# Patient Record
Sex: FEMALE | Race: WHITE | NOT HISPANIC OR LATINO | Employment: FULL TIME | URBAN - METROPOLITAN AREA
[De-identification: names, ages, dates, MRNs, and addresses within clinical notes are randomized per-mention and may not be internally consistent; named-entity substitution may affect disease eponyms.]

---

## 2023-08-31 ENCOUNTER — OFFICE VISIT (OUTPATIENT)
Dept: PRIMARY CARE | Facility: CLINIC | Age: 53
End: 2023-08-31
Payer: COMMERCIAL

## 2023-08-31 ENCOUNTER — LAB (OUTPATIENT)
Dept: LAB | Facility: LAB | Age: 53
End: 2023-08-31
Payer: COMMERCIAL

## 2023-08-31 VITALS
DIASTOLIC BLOOD PRESSURE: 80 MMHG | TEMPERATURE: 97.7 F | BODY MASS INDEX: 25.71 KG/M2 | WEIGHT: 169.1 LBS | SYSTOLIC BLOOD PRESSURE: 124 MMHG

## 2023-08-31 DIAGNOSIS — Z00.00 HEALTHCARE MAINTENANCE: ICD-10-CM

## 2023-08-31 DIAGNOSIS — Z78.0 MENOPAUSE: ICD-10-CM

## 2023-08-31 DIAGNOSIS — Z78.0 MENOPAUSE: Primary | ICD-10-CM

## 2023-08-31 DIAGNOSIS — E78.2 MIXED HYPERLIPIDEMIA: Primary | ICD-10-CM

## 2023-08-31 LAB
BASOPHILS (10*3/UL) IN BLOOD BY AUTOMATED COUNT: 0.04 X10E9/L (ref 0–0.1)
BASOPHILS/100 LEUKOCYTES IN BLOOD BY AUTOMATED COUNT: 0.7 % (ref 0–2)
EOSINOPHILS (10*3/UL) IN BLOOD BY AUTOMATED COUNT: 0.03 X10E9/L (ref 0–0.7)
EOSINOPHILS/100 LEUKOCYTES IN BLOOD BY AUTOMATED COUNT: 0.6 % (ref 0–6)
ERYTHROCYTE DISTRIBUTION WIDTH (RATIO) BY AUTOMATED COUNT: 12.3 % (ref 11.5–14.5)
ERYTHROCYTE MEAN CORPUSCULAR HEMOGLOBIN CONCENTRATION (G/DL) BY AUTOMATED: 32.8 G/DL (ref 32–36)
ERYTHROCYTE MEAN CORPUSCULAR VOLUME (FL) BY AUTOMATED COUNT: 101 FL (ref 80–100)
ERYTHROCYTES (10*6/UL) IN BLOOD BY AUTOMATED COUNT: 4.36 X10E12/L (ref 4–5.2)
HEMATOCRIT (%) IN BLOOD BY AUTOMATED COUNT: 44.2 % (ref 36–46)
HEMOGLOBIN (G/DL) IN BLOOD: 14.5 G/DL (ref 12–16)
IMMATURE GRANULOCYTES/100 LEUKOCYTES IN BLOOD BY AUTOMATED COUNT: 0.2 % (ref 0–0.9)
LEUKOCYTES (10*3/UL) IN BLOOD BY AUTOMATED COUNT: 5.4 X10E9/L (ref 4.4–11.3)
LYMPHOCYTES (10*3/UL) IN BLOOD BY AUTOMATED COUNT: 1.62 X10E9/L (ref 1.2–4.8)
LYMPHOCYTES/100 LEUKOCYTES IN BLOOD BY AUTOMATED COUNT: 29.8 % (ref 13–44)
MONOCYTES (10*3/UL) IN BLOOD BY AUTOMATED COUNT: 0.47 X10E9/L (ref 0.1–1)
MONOCYTES/100 LEUKOCYTES IN BLOOD BY AUTOMATED COUNT: 8.6 % (ref 2–10)
NEUTROPHILS (10*3/UL) IN BLOOD BY AUTOMATED COUNT: 3.27 X10E9/L (ref 1.2–7.7)
NEUTROPHILS/100 LEUKOCYTES IN BLOOD BY AUTOMATED COUNT: 60.1 % (ref 40–80)
NRBC (PER 100 WBCS) BY AUTOMATED COUNT: 0 /100 WBC (ref 0–0)
PLATELETS (10*3/UL) IN BLOOD AUTOMATED COUNT: 314 X10E9/L (ref 150–450)

## 2023-08-31 PROCEDURE — 99213 OFFICE O/P EST LOW 20 MIN: CPT | Performed by: FAMILY MEDICINE

## 2023-08-31 PROCEDURE — 84443 ASSAY THYROID STIM HORMONE: CPT

## 2023-08-31 PROCEDURE — 1036F TOBACCO NON-USER: CPT | Performed by: FAMILY MEDICINE

## 2023-08-31 PROCEDURE — 80061 LIPID PANEL: CPT

## 2023-08-31 PROCEDURE — 80053 COMPREHEN METABOLIC PANEL: CPT

## 2023-08-31 PROCEDURE — 36415 COLL VENOUS BLD VENIPUNCTURE: CPT

## 2023-08-31 PROCEDURE — 85025 COMPLETE CBC W/AUTO DIFF WBC: CPT

## 2023-08-31 NOTE — PROGRESS NOTES
Subjective   Patient ID: Bekah Nicholson is a 53 y.o. female who presents for Follow-up (EP. Here to talk about menopause and discuss BW.).  HPI  Feels she is perimenopausal and wonders about hormone replacement therapy  Is very irritable; has vasomotor symptoms- night sweats disrupt sleep    LMP in May      Objective   Visit Vitals  /80   Temp 36.5 °C (97.7 °F) (Oral)        Assessment/Plan   Diagnoses and all orders for this visit:  Menopause  -     CBC and Auto Differential; Future  -     Comprehensive Metabolic Panel; Future  -     TSH with reflex to Free T4 if abnormal; Future  -     Lipid Panel; Future  Healthcare maintenance  -     CBC and Auto Differential; Future  -     Comprehensive Metabolic Panel; Future  -     TSH with reflex to Free T4 if abnormal; Future  -     Lipid Panel; Future    Rec consult with compounding pharmacy for BHRT      Yvette Maria MD  Family Medicine   [facility]

## 2023-09-01 LAB
ALANINE AMINOTRANSFERASE (SGPT) (U/L) IN SER/PLAS: 15 U/L (ref 7–45)
ALBUMIN (G/DL) IN SER/PLAS: 4.7 G/DL (ref 3.4–5)
ALKALINE PHOSPHATASE (U/L) IN SER/PLAS: 60 U/L (ref 33–110)
ANION GAP IN SER/PLAS: 16 MMOL/L (ref 10–20)
ASPARTATE AMINOTRANSFERASE (SGOT) (U/L) IN SER/PLAS: 17 U/L (ref 9–39)
BILIRUBIN TOTAL (MG/DL) IN SER/PLAS: 0.7 MG/DL (ref 0–1.2)
CALCIUM (MG/DL) IN SER/PLAS: 10.7 MG/DL (ref 8.6–10.6)
CARBON DIOXIDE, TOTAL (MMOL/L) IN SER/PLAS: 27 MMOL/L (ref 21–32)
CHLORIDE (MMOL/L) IN SER/PLAS: 104 MMOL/L (ref 98–107)
CHOLESTEROL (MG/DL) IN SER/PLAS: 266 MG/DL (ref 0–199)
CHOLESTEROL IN HDL (MG/DL) IN SER/PLAS: 75.3 MG/DL
CHOLESTEROL/HDL RATIO: 3.5
CREATININE (MG/DL) IN SER/PLAS: 0.83 MG/DL (ref 0.5–1.05)
GFR FEMALE: 84 ML/MIN/1.73M2
GLUCOSE (MG/DL) IN SER/PLAS: 90 MG/DL (ref 74–99)
LDL: 162 MG/DL (ref 0–99)
POTASSIUM (MMOL/L) IN SER/PLAS: 5.2 MMOL/L (ref 3.5–5.3)
PROTEIN TOTAL: 7.7 G/DL (ref 6.4–8.2)
SODIUM (MMOL/L) IN SER/PLAS: 142 MMOL/L (ref 136–145)
THYROTROPIN (MIU/L) IN SER/PLAS BY DETECTION LIMIT <= 0.05 MIU/L: 3.17 MIU/L (ref 0.44–3.98)
TRIGLYCERIDE (MG/DL) IN SER/PLAS: 145 MG/DL (ref 0–149)
UREA NITROGEN (MG/DL) IN SER/PLAS: 18 MG/DL (ref 6–23)
VLDL: 29 MG/DL (ref 0–40)

## 2023-11-17 ENCOUNTER — TELEPHONE (OUTPATIENT)
Dept: PRIMARY CARE | Facility: CLINIC | Age: 53
End: 2023-11-17
Payer: COMMERCIAL

## 2023-11-17 NOTE — TELEPHONE ENCOUNTER
Patient josem on machine, have you seen her results from the saliva test & others, what is your interpretation. Call patient [phone]

## 2023-12-12 ENCOUNTER — TELEPHONE (OUTPATIENT)
Dept: PRIMARY CARE | Facility: CLINIC | Age: 53
End: 2023-12-12
Payer: COMMERCIAL

## 2023-12-12 NOTE — TELEPHONE ENCOUNTER
Patient ldm on machine, she states she's had a cold sore for 6 wks, it's a cut on Rt side of mouth. Can you prescribe a medication or does she need to be seen. Call patient & advise [phone]

## 2024-02-15 ENCOUNTER — TELEPHONE (OUTPATIENT)
Dept: PRIMARY CARE | Facility: CLINIC | Age: 54
End: 2024-02-15
Payer: COMMERCIAL

## 2024-02-15 NOTE — TELEPHONE ENCOUNTER
Pt called ldm on machine requesting a b/w order.  She asked for a full panel of b/w with the thyroid.  Please advise pt. She would like a return call.  [phone]

## 2024-02-28 NOTE — TELEPHONE ENCOUNTER
Pt ldm returning call, stated she doesn't need a follow up appt. Pt stated  just requested for her to do bw.

## 2024-02-29 NOTE — TELEPHONE ENCOUNTER
Please get additional information from patient- what doctor asked for bloodwork? She was seen to discuss menopause in August and I referred her to compounding pharmacy for saliva testing and Rx compounded hormone medication (see phone messages from Nov/Dec).

## 2024-03-01 NOTE — TELEPHONE ENCOUNTER
Spoke to pt and she wanted blood work to recheck cholesterol because it was elevated.  Also asking for thyroid tests.  States that her father has hemochromatosis and she wanted blood work to check for that as well because she states it is genetic.

## 2024-03-04 DIAGNOSIS — Z00.00 HEALTHCARE MAINTENANCE: ICD-10-CM

## 2024-03-04 DIAGNOSIS — E78.5 HYPERLIPIDEMIA, UNSPECIFIED HYPERLIPIDEMIA TYPE: ICD-10-CM

## 2024-03-04 NOTE — TELEPHONE ENCOUNTER
Please place order for lipid panel, TSH, free T4, serum iron, CBC, and CMP. Dx: HL and health maintenance. Let her know she should make appt to discuss labs.

## 2024-03-11 ENCOUNTER — LAB (OUTPATIENT)
Dept: LAB | Facility: LAB | Age: 54
End: 2024-03-11
Payer: COMMERCIAL

## 2024-03-11 DIAGNOSIS — Z00.00 HEALTHCARE MAINTENANCE: ICD-10-CM

## 2024-03-11 DIAGNOSIS — E78.5 HYPERLIPIDEMIA, UNSPECIFIED HYPERLIPIDEMIA TYPE: ICD-10-CM

## 2024-03-11 DIAGNOSIS — E78.2 MIXED HYPERLIPIDEMIA: ICD-10-CM

## 2024-03-11 LAB
ALBUMIN SERPL BCP-MCNC: 4.2 G/DL (ref 3.4–5)
ALP SERPL-CCNC: 54 U/L (ref 33–110)
ALT SERPL W P-5'-P-CCNC: 13 U/L (ref 7–45)
ANION GAP SERPL CALC-SCNC: 12 MMOL/L (ref 10–20)
AST SERPL W P-5'-P-CCNC: 15 U/L (ref 9–39)
BASOPHILS # BLD AUTO: 0.03 X10*3/UL (ref 0–0.1)
BASOPHILS NFR BLD AUTO: 0.6 %
BILIRUB SERPL-MCNC: 0.5 MG/DL (ref 0–1.2)
BUN SERPL-MCNC: 12 MG/DL (ref 6–23)
CALCIUM SERPL-MCNC: 9.5 MG/DL (ref 8.6–10.6)
CHLORIDE SERPL-SCNC: 107 MMOL/L (ref 98–107)
CHOLEST SERPL-MCNC: 199 MG/DL (ref 0–199)
CHOLESTEROL/HDL RATIO: 3.1
CO2 SERPL-SCNC: 28 MMOL/L (ref 21–32)
CREAT SERPL-MCNC: 0.65 MG/DL (ref 0.5–1.05)
EGFRCR SERPLBLD CKD-EPI 2021: >90 ML/MIN/1.73M*2
EOSINOPHIL # BLD AUTO: 0.14 X10*3/UL (ref 0–0.7)
EOSINOPHIL NFR BLD AUTO: 2.6 %
ERYTHROCYTE [DISTWIDTH] IN BLOOD BY AUTOMATED COUNT: 12 % (ref 11.5–14.5)
GLUCOSE SERPL-MCNC: 95 MG/DL (ref 74–99)
HCT VFR BLD AUTO: 40.4 % (ref 36–46)
HDLC SERPL-MCNC: 64.9 MG/DL
HGB BLD-MCNC: 13.3 G/DL (ref 12–16)
IMM GRANULOCYTES # BLD AUTO: 0.02 X10*3/UL (ref 0–0.7)
IMM GRANULOCYTES NFR BLD AUTO: 0.4 % (ref 0–0.9)
IRON SATN MFR SERPL: 35 % (ref 25–45)
IRON SERPL-MCNC: 101 UG/DL (ref 35–150)
LDLC SERPL CALC-MCNC: 119 MG/DL
LYMPHOCYTES # BLD AUTO: 1.84 X10*3/UL (ref 1.2–4.8)
LYMPHOCYTES NFR BLD AUTO: 33.9 %
MCH RBC QN AUTO: 32.8 PG (ref 26–34)
MCHC RBC AUTO-ENTMCNC: 32.9 G/DL (ref 32–36)
MCV RBC AUTO: 100 FL (ref 80–100)
MONOCYTES # BLD AUTO: 0.5 X10*3/UL (ref 0.1–1)
MONOCYTES NFR BLD AUTO: 9.2 %
NEUTROPHILS # BLD AUTO: 2.9 X10*3/UL (ref 1.2–7.7)
NEUTROPHILS NFR BLD AUTO: 53.3 %
NON HDL CHOLESTEROL: 134 MG/DL (ref 0–149)
NRBC BLD-RTO: 0 /100 WBCS (ref 0–0)
PLATELET # BLD AUTO: 256 X10*3/UL (ref 150–450)
POTASSIUM SERPL-SCNC: 4.6 MMOL/L (ref 3.5–5.3)
PROT SERPL-MCNC: 6.4 G/DL (ref 6.4–8.2)
RBC # BLD AUTO: 4.05 X10*6/UL (ref 4–5.2)
SODIUM SERPL-SCNC: 142 MMOL/L (ref 136–145)
T4 FREE SERPL-MCNC: 0.98 NG/DL (ref 0.78–1.48)
TIBC SERPL-MCNC: 287 UG/DL (ref 240–445)
TRIGL SERPL-MCNC: 78 MG/DL (ref 0–149)
TSH SERPL-ACNC: 4.04 MIU/L (ref 0.44–3.98)
UIBC SERPL-MCNC: 186 UG/DL (ref 110–370)
VLDL: 16 MG/DL (ref 0–40)
WBC # BLD AUTO: 5.4 X10*3/UL (ref 4.4–11.3)

## 2024-03-11 PROCEDURE — 80061 LIPID PANEL: CPT

## 2024-03-11 PROCEDURE — 80053 COMPREHEN METABOLIC PANEL: CPT

## 2024-03-11 PROCEDURE — 84439 ASSAY OF FREE THYROXINE: CPT

## 2024-03-11 PROCEDURE — 36415 COLL VENOUS BLD VENIPUNCTURE: CPT

## 2024-03-11 PROCEDURE — 85025 COMPLETE CBC W/AUTO DIFF WBC: CPT

## 2024-03-11 PROCEDURE — 83550 IRON BINDING TEST: CPT

## 2024-03-11 PROCEDURE — 84443 ASSAY THYROID STIM HORMONE: CPT

## 2024-03-11 PROCEDURE — 83540 ASSAY OF IRON: CPT

## 2024-03-29 ENCOUNTER — TELEPHONE (OUTPATIENT)
Dept: PRIMARY CARE | Facility: CLINIC | Age: 54
End: 2024-03-29
Payer: COMMERCIAL

## 2024-03-29 NOTE — TELEPHONE ENCOUNTER
Pt called ldm on machine stating she has not adrian back regarding her b/w results.  Please advise

## 2024-09-06 ENCOUNTER — HOSPITAL ENCOUNTER (OUTPATIENT)
Dept: RADIOLOGY | Facility: CLINIC | Age: 54
Discharge: HOME | End: 2024-09-06
Payer: COMMERCIAL

## 2024-09-06 VITALS — BODY MASS INDEX: 25.01 KG/M2 | WEIGHT: 165 LBS | HEIGHT: 68 IN

## 2024-09-06 DIAGNOSIS — Z12.31 SCREENING MAMMOGRAM FOR BREAST CANCER: ICD-10-CM

## 2024-09-06 PROCEDURE — 77067 SCR MAMMO BI INCL CAD: CPT

## 2024-09-17 NOTE — PROGRESS NOTES
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       Date of Injury: 2024 when hiking.  [place] pop R SI area.    Worse with: changing motion, getting out of the car, laying on her back  Better with: nothing  She has maintained activity walking and performing low back exercises, squats, LE stretching 3 - 4 days weekly.  Light activity.  Has introduced some light weight lifiting. Cautious as she does not want to hurt her back further.   No numbness or tingling.  No radiation of pain.    No previous LBP, images / xrays.    Has not worked with physical therapy.    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     Pain R low back/SI area, focal.  Annoying.  Feels like it needs to pop.      Past MSK HX:  Specialty Problems    None       ROS  12 point ROS reviewed and is negative except for items listed   Weight gain, glasses, snoring.  Menarche age 13. .  LMP Spring 2024    Social Hx:  Home:  Lives with , daughter Elan      Medications:   No current outpatient medications on file prior to visit.     No current facility-administered medications on file prior to visit.         Allergies:    Allergies   Allergen Reactions    Penicillins Rash     Peeling rash, arthralgias.        Physical Exam:    Visit Vitals  OB Status Perimenopausal   Smoking Status Never        Vitals reviewed    General appearance: Well-appearing well-nourished  Psych: Normal mood and affect    Neuro: Normal sensation to light touch throughout the involved extremities  Vascular: No extremity edema or discoloration.  Skin: negative.  Lymphatic:  no regional lymphadenopathy present.  Eyes: no conjunctival injection.      LUMBAR SPINE EXAM:    Visual Inspection:   Normal posture   Scoliosis: none   Deformity: none   Pelvic obliquity: +    Range of motion:  Forward flexion (90) Full, pain RSI  Extension (30) Full, pain free  Lateral bend right (30) Full, pain RSI  Lateral bend Left (30) Full, pain free RSI  Lateral rotation right (45) Full, pain RSI  Lateral rotation left (45) Full, pain RSI    Hip flexion supine: (140) Full, pain RSI  Hip extension (prone) (15) Full, pain free  Hip abduction (45) Full, pain free  Hip adduction (30-45) Full, pain free  Hip IR at 90 flexion (40) Full, pain RSI  Hip ER at 90 Flexion(40-50) Full, pain free      Palpation:   TTP the midline / spinous process no pain  TTP paraspinous musculature RSI, R QL  TTP posterior superior iliac spine no pain  TTP ischial tuberosities no pain  TTP gluteus musculature R  TTP SI joint R  TTP greater trochanter R  TTP hip joint line no pain   TTP Abdomen/no abd masses no pain    Strength:  Great toe (L5) pain free, 5/5  Ankle inversion (L4/5) pain free, 5/5  Ankle eversion (L5,S1) pain free, 5/5  Ankle Dorsiflexion (L4/5) pain free, 5/5  Ankle plantarflexion (S1/2) pain free, 5/5  Knee extension (L3/4) pain free, 5/5  Knee flexion (L5,S1)  pain free, 5/5  Hip flexion (L2/3) pain RSI, R QL, 4+/5  Hip Extension (L4,5) pain free, 4/5  Hip aduction (L4/5) pain free, 4/5  Hip adduction (L2,3)  pain free, 4+/5    Special Tests:  Stork test: negative bilaterally  Sphinx test: negative  Straight leg raise: negative  Seated slump test: negative  FADIR: pain RSI  ELISEO: pain RSI    Trendelenburg: +  SL squats: +    Neuro:  Clonus: none  Sensation:   Nl sensation to light touch L5: interspace great toe  Nl sensation to light touch S1: small toe   Nl sensation to light touch L4 lateral border great toe    Flexibility:  Popliteal angle: 160  Quad heel to butt: 4 inch  Deon test L: +  Deon test R:  +    Gait normal     Osteopathic Exam:   Rib:  Rib 10 posterior R.  Thoracic spine:  T7-8 rotated R    Left innominate posterior   No step-off appreciated at the pubic symphysis.    Piriformis restricted R  SI restricted R  Lumbar:  L4-5 rotated R, sidebent L  Sacrum rotated R on L axis.    Procedure  Today, osteopathic manipulative medicine was performed on the  thoracic spine, lumbar spine, ribs, pelvis, sacrum,  lower extremities  The patient tolerated soft tissue techniques, muscle energy techniques, articulatory techniques, respiratory assist techniques to these areas. No complications were experienced. Improved range of motion, alignment, and comfort noted OMM.      Imaging:  Lumbosacral xrays reviewed. Degenerative disc disease noted.  Imaging was personally interpreted and reviewed with the patient and/or family    Impression and Plan:  Bekah Nicholson is a 54 y.o. female who presented on 09/18/2024  with back pain, R SI joint dysfunction.  History of R low back pain R SI joint pain since April 2024 when hiking and felt a pop. On exam pain R SI with ROM, poor LE flexibility and poor core strength, TTP R SI, R QL, R gluteus medius muscle, tendon.    Xrays  Degenerative disc disease noted..  We discussed the importance of gluteal & hip strengthening to offer stability to SI and lumbar spine.  Physical therapy exercises with good technique  encouraged with patient goal to perform 4-5 days weekly. OMM performed and well tolerated.  We discussed use of foam roller, stretching and proper hydration following OMM.  Follow up 2 weeks.         ** Please excuse any errors in grammar or translation related to this dictation. Voice recognition software was utilized to prepare this document. **

## 2024-09-18 ENCOUNTER — HOSPITAL ENCOUNTER (OUTPATIENT)
Dept: RADIOLOGY | Facility: CLINIC | Age: 54
Discharge: HOME | End: 2024-09-18
Payer: COMMERCIAL

## 2024-09-18 ENCOUNTER — OFFICE VISIT (OUTPATIENT)
Dept: ORTHOPEDIC SURGERY | Facility: CLINIC | Age: 54
End: 2024-09-18
Payer: COMMERCIAL

## 2024-09-18 VITALS — BODY MASS INDEX: 25.4 KG/M2 | WEIGHT: 167 LBS

## 2024-09-18 DIAGNOSIS — M99.03 SOMATIC DYSFUNCTION OF LUMBAR REGION: ICD-10-CM

## 2024-09-18 DIAGNOSIS — M99.02 SOMATIC DYSFUNCTION OF THORACIC REGION: ICD-10-CM

## 2024-09-18 DIAGNOSIS — M99.04 SOMATIC DYSFUNCTION OF SACRAL REGION: ICD-10-CM

## 2024-09-18 DIAGNOSIS — M99.05 SOMATIC DYSFUNCTION OF PELVIS REGION: ICD-10-CM

## 2024-09-18 DIAGNOSIS — M54.50 LOW BACK PAIN: ICD-10-CM

## 2024-09-18 DIAGNOSIS — M99.08 SOMATIC DYSFUNCTION OF RIB CAGE REGION: ICD-10-CM

## 2024-09-18 DIAGNOSIS — M53.3 SACROILIAC DYSFUNCTION: Primary | ICD-10-CM

## 2024-09-18 DIAGNOSIS — M99.06 SOMATIC DYSFUNCTION OF LOWER EXTREMITY: ICD-10-CM

## 2024-09-18 PROCEDURE — 98927 OSTEOPATH MANJ 5-6 REGIONS: CPT | Performed by: PEDIATRICS

## 2024-09-18 PROCEDURE — 99204 OFFICE O/P NEW MOD 45 MIN: CPT | Performed by: PEDIATRICS

## 2024-09-18 PROCEDURE — 72100 X-RAY EXAM L-S SPINE 2/3 VWS: CPT | Performed by: RADIOLOGY

## 2024-09-18 PROCEDURE — 72100 X-RAY EXAM L-S SPINE 2/3 VWS: CPT

## 2024-09-18 PROCEDURE — 99214 OFFICE O/P EST MOD 30 MIN: CPT | Performed by: PEDIATRICS

## 2024-09-18 PROCEDURE — 1036F TOBACCO NON-USER: CPT | Performed by: PEDIATRICS

## 2024-09-18 ASSESSMENT — PAIN SCALES - GENERAL: PAINLEVEL: 3

## 2024-09-18 ASSESSMENT — ENCOUNTER SYMPTOMS
LOSS OF SENSATION IN FEET: 0
OCCASIONAL FEELINGS OF UNSTEADINESS: 0
DEPRESSION: 0

## 2024-09-26 ENCOUNTER — OFFICE VISIT (OUTPATIENT)
Dept: ORTHOPEDIC SURGERY | Facility: CLINIC | Age: 54
End: 2024-09-26
Payer: COMMERCIAL

## 2024-09-26 VITALS — BODY MASS INDEX: 25.4 KG/M2 | WEIGHT: 167 LBS

## 2024-09-26 DIAGNOSIS — M54.50 ACUTE RIGHT-SIDED LOW BACK PAIN WITHOUT SCIATICA: Primary | ICD-10-CM

## 2024-09-26 DIAGNOSIS — M99.04 SOMATIC DYSFUNCTION OF SACRAL REGION: ICD-10-CM

## 2024-09-26 DIAGNOSIS — M99.08 SOMATIC DYSFUNCTION OF RIB CAGE REGION: ICD-10-CM

## 2024-09-26 DIAGNOSIS — M99.06 SOMATIC DYSFUNCTION OF LOWER EXTREMITY: ICD-10-CM

## 2024-09-26 DIAGNOSIS — M99.03 SOMATIC DYSFUNCTION OF LUMBAR REGION: ICD-10-CM

## 2024-09-26 DIAGNOSIS — M99.02 SOMATIC DYSFUNCTION OF THORACIC REGION: ICD-10-CM

## 2024-09-26 DIAGNOSIS — M99.05 SOMATIC DYSFUNCTION OF PELVIS REGION: ICD-10-CM

## 2024-09-26 PROCEDURE — 98927 OSTEOPATH MANJ 5-6 REGIONS: CPT | Performed by: PEDIATRICS

## 2024-09-26 PROCEDURE — 97530 THERAPEUTIC ACTIVITIES: CPT | Performed by: PEDIATRICS

## 2024-09-26 PROCEDURE — 99214 OFFICE O/P EST MOD 30 MIN: CPT | Performed by: PEDIATRICS

## 2024-09-26 PROCEDURE — 99214 OFFICE O/P EST MOD 30 MIN: CPT | Mod: 25 | Performed by: PEDIATRICS

## 2024-09-26 ASSESSMENT — PAIN SCALES - GENERAL: PAINLEVEL: 2

## 2024-09-26 ASSESSMENT — ENCOUNTER SYMPTOMS
LOSS OF SENSATION IN FEET: 0
OCCASIONAL FEELINGS OF UNSTEADINESS: 0
DEPRESSION: 0

## 2024-09-26 NOTE — PROGRESS NOTES
Chief Complaint   Patient presents with    Lower Back - Pain, Follow-up       Consulting physician: Yvette Maria MD    A report with my findings and recommendations will be sent to the primary and referring physician via written or electronic means when information is available    History of Present Illness:  Bekah Nicholson is a 54 y.o. female who presented on 9/18/24  with back pain, R SI joint dysfunction.  History of R low back pain R SI joint pain since April 2024 when hiking and felt a pop. On exam pain R SI with ROM, poor LE flexibility and poor core strength, TTP R SI, R QL, R gluteus medius muscle, tendon.    Xrays  Degenerative disc disease noted.  We discussed the importance of gluteal & hip strengthening to offer stability to SI and lumbar spine.  Physical therapy exercises with good technique  encouraged with patient goal to perform 4-5 days weekly. OMM performed and well tolerated.  We discussed use of foam roller, stretching and proper hydration following OMM.  Follow up 2 weeks.     April 2024 when hiking.  [place] pop R SI area.  Worse with: changing motion, getting out of the car, laying on her back.  She has maintained activity walking and performing low back exercises, squats, LE stretching 3 - 4 days weekly.  Light activity.  Has introduced some light weight lifiting. Cautious as she does not want to hurt her back further. No numbness or tingling.  No radiation of pain.  No previous LBP, images / xrays.  Has not worked with physical therapy.  Pain R low back/SI area, focal.  Annoying.  Feels like it needs to pop.    9/26/24 Some improvement after OMM. Performed some weight lifting.  She has been avoiding crossing her legs.  She is stretching daily.  Introduced squats and gluteal activation to her exercises.      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           Past MSK HX:  Specialty Problems    None       ROS  12 point ROS reviewed  and is negative except for items listed   Weight gain, glasses, snoring.  Menarche age 13. .  LMP Spring 2024    Social Hx:  Home:  Lives with , daughter Elan      Medications:   No current outpatient medications on file prior to visit.     No current facility-administered medications on file prior to visit.         Allergies:    Allergies   Allergen Reactions    Penicillins Rash     Peeling rash, arthralgias.        Physical Exam:    Visit Vitals  OB Status Perimenopausal   Smoking Status Never        Vitals reviewed    General appearance: Well-appearing well-nourished  Psych: Normal mood and affect    Neuro: Normal sensation to light touch throughout the involved extremities  Vascular: No extremity edema or discoloration.  Skin: negative.  Lymphatic: no regional lymphadenopathy present.  Eyes: no conjunctival injection.      LUMBAR SPINE EXAM:    Visual Inspection:   Normal posture   Scoliosis: none   Deformity: none   Pelvic obliquity: +    Range of motion:  Forward flexion (90) Full, pain RSI  Extension (30) Full, pain free  Lateral bend right (30) Full, pain RSI  Lateral bend Left (30) Full, pain free RSI  Lateral rotation right (45) Full, pain RSI  Lateral rotation left (45) Full, pain RSI    Hip flexion supine: (140) Full, pain RSI  Hip extension (prone) (15) Full, pain free  Hip abduction (45) Full, pain free  Hip adduction (30-45) Full, pain free  Hip IR at 90 flexion (40) Full, pain RSI  Hip ER at 90 Flexion(40-50) Full, pain free      Palpation:   TTP the midline / spinous process no pain  TTP paraspinous musculature RSI, R QL  TTP posterior superior iliac spine no pain  TTP ischial tuberosities no pain  TTP gluteus musculature R  TTP SI joint R  TTP greater trochanter R  TTP hip joint line no pain   TTP Abdomen/no abd masses no pain    Strength:  Great toe (L5) pain free, 5/5  Ankle inversion (L4/5) pain free, 5/5  Ankle eversion (L5,S1) pain free, 5/5  Ankle Dorsiflexion (L4/5) pain free,  5/5  Ankle plantarflexion (S1/2) pain free, 5/5  Knee extension (L3/4) pain free, 5/5  Knee flexion (L5,S1)  pain free, 5/5  Hip flexion (L2/3) pain RSI, R QL, 4+/5  Hip Extension (L4,5) pain free, 4/5  Hip aduction (L4/5) pain free, 4/5  Hip adduction (L2,3)  pain free, 4+/5    Special Tests:  Stork test: negative bilaterally  Sphinx test: negative  Straight leg raise: negative  Seated slump test: negative  FADIR: pain RSI  ELISEO: pain RSI    Trendelenburg: +  SL squats: +    Neuro:  Clonus: none  Sensation:   Nl sensation to light touch L5: interspace great toe  Nl sensation to light touch S1: small toe   Nl sensation to light touch L4 lateral border great toe    Flexibility:  Popliteal angle: 160  Quad heel to butt: 4 inch  Deon test L: +  Deon test R: +    Gait normal     Osteopathic Exam:   Rib:  Rib 10 posterior R.  Thoracic spine:  T7-8 rotated R    Left innominate posterior   No step-off appreciated at the pubic symphysis.    Piriformis restricted R  SI restricted R  Lumbar:  L4-5 rotated R, sidebent L  Sacrum rotated R on L axis.    Procedure  Today, osteopathic manipulative medicine was performed on the  thoracic spine, lumbar spine, ribs, pelvis, sacrum,  lower extremities  The patient tolerated soft tissue techniques, muscle energy techniques, articulatory techniques, respiratory assist techniques to these areas. No complications were experienced. Improved range of motion, alignment, and comfort noted OMM.      Imaging:  Lumbosacral xrays reviewed. Degenerative disc disease noted.  Imaging was personally interpreted and reviewed with the patient and/or family    Impression and Plan:  Bekah Nicholson is a 54 y.o. female who presented on 9/18/24  with back pain, R SI joint dysfunction.  History of R low back pain R SI joint pain since April 2024 when hiking and felt a pop. On exam pain R SI with ROM, poor LE flexibility and poor core strength, TTP R SI, R QL, R gluteus medius muscle, tendon.    Xrays   Degenerative disc disease noted.  We discussed the importance of gluteal & hip strengthening to offer stability to SI and lumbar spine.  Physical therapy exercises with good technique  encouraged with patient goal to perform 4-5 days weekly. OMM performed and well tolerated.  We discussed use of foam roller, stretching and proper hydration following OMM.  Follow up 2 weeks.     9/26/24  OMM performed.  ATC taught additional exercises to address piriformis, lumbar rotation, gluteal activation, hip abduction.  Follow up 1-2 weeks.     A detailed exercise program (< 15 minutes of education time) was demonstrated and taught to the patient by Ash Sutton ATC / SMITHA. The purpose of the program was to restore functional strength, and/or range of motion, and/or flexibility. A handout with the exercises and instructions for online access to video demonstrations was provided.     ** Please excuse any errors in grammar or translation related to this dictation. Voice recognition software was utilized to prepare this document. **

## 2024-09-26 NOTE — PROGRESS NOTES
Access Code: [email or web address]  URL: [email or web address]  Date: 09/26/2024  Prepared by: Ash Sutton AT, PTA    Exercises  - Supine Piriformis Stretch with Foot on Ground  - 1-3 x daily - 7 x weekly - 3 reps - 30 hold  - Supine Figure 4 Piriformis Stretch  - 1-3 x daily - 7 x weekly - 3 reps - 30 hold  - Supine Lower Trunk Rotation  - 1-3 x daily - 7 x weekly - 1-3 sets - 10 reps - 5 hold  - Quadruped Hip Extension Kicks  - 1 x daily - 5-7 x weekly - 1-3 sets - 10 reps - 5 hold  - Standing Runner Hip Flexion Extension  - 1 x daily - 5-7 x weekly - 1-3 sets - 10 reps  - Hip Hiking on Step  - 1 x daily - 5-7 x weekly - 1-3 sets - 10 reps

## 2024-10-10 ENCOUNTER — HOSPITAL ENCOUNTER (OUTPATIENT)
Dept: RADIOLOGY | Facility: CLINIC | Age: 54
Discharge: HOME | End: 2024-10-10
Payer: COMMERCIAL

## 2024-10-10 ENCOUNTER — APPOINTMENT (OUTPATIENT)
Dept: ORTHOPEDIC SURGERY | Facility: CLINIC | Age: 54
End: 2024-10-10
Payer: COMMERCIAL

## 2024-10-10 DIAGNOSIS — M54.50 ACUTE RIGHT-SIDED LOW BACK PAIN WITHOUT SCIATICA: ICD-10-CM

## 2024-10-10 DIAGNOSIS — M25.551 HIP PAIN, ACUTE, RIGHT: ICD-10-CM

## 2024-10-10 DIAGNOSIS — M54.50 ACUTE RIGHT-SIDED LOW BACK PAIN WITHOUT SCIATICA: Primary | ICD-10-CM

## 2024-10-10 PROCEDURE — 99214 OFFICE O/P EST MOD 30 MIN: CPT | Performed by: PEDIATRICS

## 2024-10-10 PROCEDURE — 73521 X-RAY EXAM HIPS BI 2 VIEWS: CPT | Mod: 52

## 2024-10-10 PROCEDURE — 1036F TOBACCO NON-USER: CPT | Performed by: PEDIATRICS

## 2024-10-10 ASSESSMENT — PAIN SCALES - GENERAL: PAINLEVEL: 2

## 2024-10-10 NOTE — PROGRESS NOTES
Chief Complaint   Patient presents with    Lower Back - Follow-up, Pain       Consulting physician: Yvette Maria MD    A report with my findings and recommendations will be sent to the primary and referring physician via written or electronic means when information is available    History of Present Illness:  Bekah Nicholson is a 54 y.o. female who presented on 9/18/24  with back pain, R SI joint dysfunction.  History of R low back pain R SI joint pain since April 2024 when hiking and felt a pop. On exam pain R SI with ROM, poor LE flexibility and poor core strength, TTP R SI, R QL, R gluteus medius muscle, tendon.    Xrays  Degenerative disc disease noted.  We discussed the importance of gluteal & hip strengthening to offer stability to SI and lumbar spine.  Physical therapy exercises with good technique  encouraged with patient goal to perform 4-5 days weekly. 9/18/24, 9/26/24 OMM performed.  ATC taught additional exercises to address piriformis, lumbar rotation, gluteal activation, hip abduction..  We discussed use of foam roller, stretching and proper hydration following OMM.  Follow up 2 weeks.     April 2024 when hiking.  [place] pop R SI area.  Worse with: changing motion, getting out of the car, laying on her back.  She has maintained activity walking and performing low back exercises, squats, LE stretching 3 - 4 days weekly.  Light activity.  Has introduced some light weight lifiting. Cautious as she does not want to hurt her back further. No numbness or tingling.  No radiation of pain.  No previous LBP, images / xrays.  Has not worked with physical therapy.  Pain R low back/SI area, focal.  Annoying.  Feels like it needs to pop.    9/26/24 Some improvement after OMM. Performed some weight lifting.  She has been avoiding crossing her legs.  She is stretching daily.  Introduced squats and gluteal activation to her exercises.      10/10/24  No improvement in R posterior hip pain or R SI pain. She continues  to report tightness posterior R hip through R SI area.  Feels like it needs to pop.  She has been performing home exercise program without improvement.  Occasional numbness to dorsum R foot especially when driving, sometimes walking.      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           Past MSK HX:  Specialty Problems    None       ROS  12 point ROS reviewed and is negative except for items listed   Weight gain, glasses, snoring.  Menarche age 13. .  LMP Spring 2024    Social Hx:  Home:  Lives with , daughter Elan      Medications:   No current outpatient medications on file prior to visit.     No current facility-administered medications on file prior to visit.         Allergies:    Allergies   Allergen Reactions    Penicillins Rash     Peeling rash, arthralgias.        Physical Exam:    Visit Vitals  OB Status Perimenopausal   Smoking Status Never        Vitals reviewed    General appearance: Well-appearing well-nourished  Psych: Normal mood and affect    Neuro: Normal sensation to light touch throughout the involved extremities  Vascular: No extremity edema or discoloration.  Skin: negative.  Lymphatic: no regional lymphadenopathy present.  Eyes: no conjunctival injection.      LUMBAR SPINE EXAM:    Visual Inspection:   Normal posture   Scoliosis: none   Deformity: none   Pelvic obliquity: +    Range of motion:  Forward flexion (90) Full, pain RSI  Extension (30) Full, pain free  Lateral bend right (30) Full, pain RSI  Lateral bend Left (30) Full, pain free RSI  Lateral rotation right (45) Full, pain RSI  Lateral rotation left (45) Full, pain RSI    Hip flexion supine: (140) Full, pain RSI  Hip extension (prone) (15) Full, pain free  Hip abduction (45) Full, pain free  Hip adduction (30-45) Full, pain free  Hip IR at 90 flexion (40) Full, pain RSI  Hip ER at 90 Flexion(40-50) Full, pain free      Palpation:   TTP the midline / spinous  process no pain  TTP paraspinous musculature RSI, R QL  TTP posterior superior iliac spine no pain  TTP ischial tuberosities no pain  TTP gluteus musculature R  TTP SI joint R  TTP greater trochanter R  TTP hip joint line no pain   TTP Abdomen/no abd masses no pain    Strength:  Great toe (L5) pain free, 5/5  Ankle inversion (L4/5) pain free, 5/5  Ankle eversion (L5,S1) pain free, 5/5  Ankle Dorsiflexion (L4/5) pain free, 5/5  Ankle plantarflexion (S1/2) pain free, 5/5  Knee extension (L3/4) pain free, 5/5  Knee flexion (L5,S1)  pain free, 5/5  Hip flexion (L2/3) pain RSI, R QL, 4+/5  Hip Extension (L4,5) pain free, 4/5  Hip aduction (L4/5) pain free, 4/5  Hip adduction (L2,3)  pain free, 4+/5    Special Tests:  Stork test: negative bilaterally  Sphinx test: negative  Straight leg raise: negative  Seated slump test: negative  FADIR: pain RSI  ELISEO: pain RSI    Trendelenburg: +  SL squats: +    Neuro:  Clonus: none  Sensation:   Nl sensation to light touch L5: interspace great toe  Nl sensation to light touch S1: small toe   Nl sensation to light touch L4 lateral border great toe    Flexibility:  Popliteal angle: 160  Quad heel to butt: 4 inch  Deon test L: +  Deon test R: +    Gait normal     Imaging:  Lumbosacral xrays reviewed. Degenerative disc disease noted.  Bilateral Hip and AP pelvis obtained.   Imaging was personally interpreted and reviewed with the patient and/or family    Impression and Plan:  Bekah Nicholson is a 54 y.o. female who presented on 9/18/24  with back pain, R SI joint dysfunction.  History of R low back pain R SI joint pain since April 2024 when hiking and felt a pop. On exam pain R SI with ROM, poor LE flexibility and poor core strength, TTP R SI, R QL, R gluteus medius muscle, tendon.    Xrays  Degenerative disc disease noted.  We discussed the importance of gluteal & hip strengthening to offer stability to SI and lumbar spine.  Physical therapy exercises with good technique   encouraged with patient goal to perform 4-5 days weekly. 9/18/24, 9/26/24 OMM performed.  ATC taught additional exercises to address piriformis, lumbar rotation, gluteal activation, hip abduction..  We discussed use of foam roller, stretching and proper hydration following OMM.  Follow up 2 weeks.     10/10/24  Physical therapy requisition provided.  Javy Dowell if [facility] is not available.  Begin stability and strengthening program twice weekly with home program daily.  If no improvement in 4 weeks, we will consider MRI Lumbar spine v R hip to evaluate for labral tear.         ** Please excuse any errors in grammar or translation related to this dictation. Voice recognition software was utilized to prepare this document. **

## 2024-11-06 ENCOUNTER — EVALUATION (OUTPATIENT)
Dept: PHYSICAL THERAPY | Facility: CLINIC | Age: 54
End: 2024-11-06
Payer: COMMERCIAL

## 2024-11-06 DIAGNOSIS — M54.50 ACUTE RIGHT-SIDED LOW BACK PAIN WITHOUT SCIATICA: Primary | ICD-10-CM

## 2024-11-06 DIAGNOSIS — M25.551 HIP PAIN, ACUTE, RIGHT: ICD-10-CM

## 2024-11-06 PROCEDURE — 97110 THERAPEUTIC EXERCISES: CPT | Mod: GP

## 2024-11-06 PROCEDURE — 97161 PT EVAL LOW COMPLEX 20 MIN: CPT | Mod: GP

## 2024-11-06 ASSESSMENT — ENCOUNTER SYMPTOMS
DEPRESSION: 0
OCCASIONAL FEELINGS OF UNSTEADINESS: 0
LOSS OF SENSATION IN FEET: 1

## 2024-11-06 NOTE — PROGRESS NOTES
Physical Therapy Evaluation    Patient Name: Bekah Nicholson  MRN: [id number]  Today's Date: 11/6/2024  Time Calculation  Start Time: 1450  Stop Time: 1535  Time Calculation (min): 45 min  PT Evaluation Time Entry  PT Evaluation (Low) Time Entry: 35  PT Therapeutic Procedures Time Entry  Therapeutic Exercise Time Entry: 10        Insurance: [employer]  Authorization required by insurance after initial evaluation  Primary diagnosis: acute R sided LBP  Visit # 1    Assessment   Bekah Nicholson is a 54 y.o. referred for acute R sided low back pain, R hip pain who presents with chronic, constant pain levels with tenderness upon palpation R PSIS. Patient demonstrates concordant pain with lumbar ROM, concordant pain with thigh thrust, as well as decreased force production bilateral lower extremity/core. At this time, patient is limited with walking, sitting too long, exercise/lifting, and R sidelying. Patient would benefit from PT interventions to address the stated impairments, improve functional mobility, establish HEP, and return toward prior level of function.     Plan   Treatment/Interventions: Cryotherapy, Education/ Instruction, Hot pack, Manual therapy, Neuromuscular re-education, Therapeutic activities, Therapeutic exercises  PT Plan: Skilled PT  PT Frequency: 1-2 time per week  Duration: 8 visits  Rehab Potential: Good  Plan of Care Agreement: Patient      Current Problem  1. Acute right-sided low back pain without sciatica  PT eval and treat    Follow Up In Physical Therapy      2. Hip pain, acute, right  PT eval and treat    Follow Up In Physical Therapy        Problem List Items Addressed This Visit    None  Visit Diagnoses         Codes    Acute right-sided low back pain without sciatica    -  Primary M54.50    Relevant Orders    Follow Up In Physical Therapy    Hip pain, acute, right     M25.551    Relevant Orders    Follow Up In Physical Therapy            General:  General  Reason for Referral: Diagnosis   M54.50 (ICD-10-CM) - Acute right-sided low back pain without sciatica  M25.551 (ICD-10-CM) - Hip pain, acute, right  Referred By: Gin Zabala DO  Precautions:  Precautions  STEADI Fall Risk Score (The score of 4 or more indicates an increased risk of falling): 3  Precautions Comment: not crossing legs per patient    Subjective:  Chief complaints: R low back/buttock, nerve pain down the R leg to R foot  Onset Date/Mechanism of Injury: 04/2024 hiking felt a weird pull like something is shifted. Dealt with it and cannot get rid of it all.  Referred for PT: 10/11/2024  Previous History: Followed up with Dr. Zabala, 2x manipulation with Dr. Zabala and nicolas, but hasn't done them in a couple weeks.  Personal Factors that may impact care: chronic symptoms   Patient is cleared for physical therapy services by physician referral. Discussed concerns on intake forms. Patient is following up with medical providers to address.    Pain:  Current: 1/10 Best: 1/10 Worst: 4/10   Location: points to R PSIS/SI region, posterior LE to knee, dorsal and plantar foot   Type: jabbing, shooting, nerve pain   Aggravators: walking too much, sitting too long, getting out of the car   Alleviators: after stretching   Numbness/tingling: no    Function:   Work/Recreation: consulting; sitting, standing   Prior Level: no history of back or hip pain in the past   Current limitations: walking, sitting too long, exercise/lifting, R sidelying   Condition: Improving     Home Situation:    Stairs: no issues   Lives with family    Sleep:  Preferred position(s): supine     Goals for Therapy:    Strength and remove pain    Objective   Gait: Indep ambulation  Posture: forward head, rounded shoulders, decreased lumbar lordosis  Palpation: (+) TTP PSIS  Sensation: intact to light touch B LE    Lumbar AROM:  Flexion: fingertips to toes, no change  Extension: min limitation, no change  Side Bend R: fingertips to joint line, 1/10 R LB/PSIS region  Side  Bend L: fingertips to joint line, 1/10 R LB/PSIS region  Prone press up: 1-2/10 midline low back, 0/10 PSIS region    Hip Strength:  Flexion: 4-/5 R, 4/5 L  Abduction: 3+/5 B  Adduction: 5/5 B  Extension: 3+/5 B    Knee Strength:  Flexion: 5/5 B  Extension: 5/5 B    Ankle & Foot Strength:  Dorsiflexion: 5/5    Special Tests:  (+) thigh thrust, gowers  (-) SI compression, distraction, SLR, ELISEO, scour, sacral thrust    Outcome Measures:  Modified Oswestry: patient did not fill out form in its entirety, will add in next visit once completed    Treatment:  Therapeutic Exercise: Education on posture, positioning with use of lumbar roll, modification or discontinuation of any exercise that increases symptoms (i.e.peripheralization of symptoms).  Prone lying, prone lying on forearms, prone press ups on forearms, off mat R hip extension/hip flexor stretch    Access Code: [email or web address]  URL: [email or web address]  Date: 11/06/2024  Prepared by: Claribel Mejia  - Seated Correct Posture   - Lying Prone  - 1-2 x daily - 3-5 minutes hold  - Prone Press Up On Elbows  - 1-2 x daily - 2 sets - 10 reps  - Supine Quadriceps Stretch with Strap on Table  - 1-2 x daily - 3 reps - 30 seonds hold    OP Education: verbal, demonstration, HEP handout    Goals:  Active       PT Problem       Patient will resume exercise without adverse reaction       Start:  11/06/24    Expected End:  01/15/25            Patient will walk, sit, transfer with pain no greater than 2/10 at worst, no pain on average       Start:  11/06/24    Expected End:  01/15/25            Patient will improve strength by >=1/3 MMT to increase ease with daily functional mobility and assist with reduction of pain levels       Start:  11/06/24    Expected End:  01/15/25            Patient will demonstrate independence and compliance with HEP and self management        Start:  11/06/24    Expected End:  01/15/25            Patient will demonstrate a 10  % improvement on Modified Oswestry to demonstrate increased functional mobility       Start:  11/06/24    Expected End:  01/15/25

## 2024-11-14 ENCOUNTER — TREATMENT (OUTPATIENT)
Dept: PHYSICAL THERAPY | Facility: CLINIC | Age: 54
End: 2024-11-14
Payer: COMMERCIAL

## 2024-11-14 DIAGNOSIS — M54.50 ACUTE RIGHT-SIDED LOW BACK PAIN WITHOUT SCIATICA: Primary | ICD-10-CM

## 2024-11-14 DIAGNOSIS — M25.551 HIP PAIN, ACUTE, RIGHT: ICD-10-CM

## 2024-11-14 PROCEDURE — 97110 THERAPEUTIC EXERCISES: CPT | Mod: GP

## 2024-11-14 NOTE — PROGRESS NOTES
Physical Therapy    Physical Therapy Treatment    Patient Name: Bekah Nicholson  MRN: [id number]  Today's Date: 11/14/2024  Time Calculation  Start Time: 1518  Stop Time: 1600  Time Calculation (min): 42 min     PT Therapeutic Procedures Time Entry  Therapeutic Exercise Time Entry: 42        Insurance: [employer]  NO AUTH/ $700 DED (MET)/ 80% COVERAGE/ [place] VISITS/ $8000 OOP (NM)/ PER [employer] SITE REF#[id number]  Visit # 2    Assessment:  Patient returns for first follow up visit. Reviewed HEP; verbal needed for breathing sequencing and recommend patient to out loud count in order to ensure breathing with therapeutic exercises. Progressed with therapeutic exercises without adverse reaction. 1/10 at end of treatment.    Plan:  Continue per initial PT POC. Add in manual therapy next visit    Current Problem  1. Acute right-sided low back pain without sciatica        2. Hip pain, acute, right          Problem List Items Addressed This Visit             ICD-10-CM    Acute right-sided low back pain without sciatica - Primary M54.50    Hip pain, acute, right M25.551       General   **acute R sided low back pain, R hip pain who presents with chronic, constant pain levels with tenderness upon palpation R PSIS      Subjective    Pain no greater than 1/10   HEP a couple days this week, no issues    Pain  1/10 R PSIS/gluteal    Objective     Hip AROM:  Flexion: R 118 deg, L 125 deg  Internal Rotation: R 25 deg, L 27 deg  External Rotation: R 55 deg, L 60 deg    Joint Mobility: Hypomobility lumbar spine    Bridge endurance: 5 seconds  Leg length: (-)    Outcome Measures:  Modified Oswestry: 20% disability    Treatments:  Therapeutic Exercise: Education on posture, positioning with use of lumbar roll, modification or discontinuation of any exercise that increases symptoms (i.e.peripheralization of symptoms).  Scifit stepper x 5 mins   Step stretch R posterior 2 x 10 reps   Shuttle DL L4 2 x 15  Prone lying  Prone lying on forearms    Prone press ups on forearms 2 x 10 reps  Off mat R hip extension/hip flexor stretch 3 x 30 sec  Hooklying TA x 10 reps  Hooklying transitioned to sitting for patient comfort TA with hip adduction 2 x 10 reps  Sitting TA with hip clamshell Green TB 2 x 10 reps  Seated figure 4 stretch 3 x 30 sec     OP Education: verbal, demonstration, HEP handout    Access Code: [email or web address]  URL: [email or web address]  Date: 11/14/2024  Prepared by: Claribel    Exercises  - Hooklying Transversus Abdominis Palpation  - 1 x daily - 2 sets - 10 reps  - Supine Hip Adduction Isometric with Ball  - 1 x daily - 2 sets - 10 reps  - Hooklying Clamshell with Resistance  - 1 x daily - 2 sets - 10 reps  - Seated Figure 4 Stretch  - 3 reps - 30 seconds hold    Access Code: [email or web address]  URL: [email or web address]  Date: 11/14/2024  Prepared by: Claribel    Exercises  - Hooklying Transversus Abdominis Palpation  - 1 x daily - 2 sets - 10 reps  - Supine Hip Adduction Isometric with Ball  - 1 x daily - 2 sets - 10 reps  - Hooklying Clamshell with Resistance  - 1 x daily - 2 sets - 10 reps     Access Code: [email or web address]  URL: [email or web address]  Date: 11/06/2024  Prepared by: Claribel     Exercises  - Seated Correct Posture   - Lying Prone  - 1-2 x daily - 3-5 minutes hold  - Prone Press Up On Elbows  - 1-2 x daily - 2 sets - 10 reps  - Supine Quadriceps Stretch with Strap on Table  - 1-2 x daily - 3 reps - 30 seonds hold       Goals:  Active       PT Problem       Patient will resume exercise without adverse reaction       Start:  11/06/24    Expected End:  01/15/25            Patient will walk, sit, transfer with pain no greater than 2/10 at worst, no pain on average       Start:  11/06/24    Expected End:  01/15/25            Patient will improve strength by >=1/3 MMT to increase ease with daily functional mobility and assist with reduction of pain levels       Start:  11/06/24     Expected End:  01/15/25            Patient will demonstrate independence and compliance with HEP and self management        Start:  11/06/24    Expected End:  01/15/25            Patient will demonstrate a 10 % improvement on Modified Oswestry to demonstrate increased functional mobility       Start:  11/06/24    Expected End:  01/15/25

## 2024-12-06 ENCOUNTER — TREATMENT (OUTPATIENT)
Dept: PHYSICAL THERAPY | Facility: CLINIC | Age: 54
End: 2024-12-06
Payer: COMMERCIAL

## 2024-12-06 DIAGNOSIS — M25.551 HIP PAIN, ACUTE, RIGHT: ICD-10-CM

## 2024-12-06 DIAGNOSIS — M54.50 ACUTE RIGHT-SIDED LOW BACK PAIN WITHOUT SCIATICA: Primary | ICD-10-CM

## 2024-12-06 PROCEDURE — 97110 THERAPEUTIC EXERCISES: CPT | Mod: GP

## 2024-12-06 NOTE — PROGRESS NOTES
Physical Therapy    Physical Therapy Treatment    Patient Name: Bekah Nicholson  MRN: [id number]  Today's Date: 12/6/2024  Time Calculation  Start Time: 0747  Stop Time: 0829  Time Calculation (min): 42 min     PT Therapeutic Procedures Time Entry  Manual Therapy Time Entry: 4  Therapeutic Exercise Time Entry: 38        Insurance: [employer]  NO AUTH/ $700 DED (MET)/ 80% COVERAGE/ [place] VISITS/ $8000 OOP (NM)/ PER [employer] SITE REF#[id number]  Visit # 3    Assessment:  Progressed with therapeutic exercises to patient tolerance. Improvement in breathing, minor intermittent cues initially with Good carry over within session. Addition of manual therapy to improve mobility and assist with management of symptoms. No adverse effects were noted following treatment. 1-2/10 at end of treatment.    Plan:  Continue per initial PT POC. Plan to continue manual therapy as indicated.    Current Problem  1. Acute right-sided low back pain without sciatica  Follow Up In Physical Therapy      2. Hip pain, acute, right  Follow Up In Physical Therapy          Problem List Items Addressed This Visit             ICD-10-CM    Acute right-sided low back pain without sciatica - Primary M54.50    Hip pain, acute, right M25.551         General   **acute R sided low back pain, R hip pain who presents with chronic, constant pain levels with tenderness upon palpation R PSIS      Subjective    HEP 5 days in the past week.  Sore this morning.  Pain no greater than 2/10. Feels like it needs to pop.  Getting easier to lay on stomach.    Pain  2/10 R PSIS/gluteal achy     Objective       Treatments:  Therapeutic Exercise: Education on posture, positioning with use of lumbar roll, modification or discontinuation of any exercise that increases symptoms (i.e.peripheralization of symptoms).  Scifit stepper S20 x 5 mins   Step stretch R posterior x 20 reps   Shuttle DL L4 2 x 15 reps, SL L3 2 x 15 reps  Seated figure 4 stretch 3 x 30 sec  Hooklying TA x 10  reps  Hooklying TA with hip adduction 2 x 10 reps  Sitting TA with hip clamshell Green TB 2 x 10 reps  Off mat R hip extension/hip flexor stretch 3 x 30 sec    Not today:  Prone lying  Prone lying on forearms   Prone press ups on forearms 2 x 10 reps    Manual Therapy: Patient educated regarding manual therapy risks and benefits.  Patient given opportunity to stop if needed.  Patient aware and agrees.  Sidelying sacroiliac mobilization grade V  Cavitation reported by patient in low back      OP Education: verbal, demonstration, HEP handout    Access Code: [email or web address]  URL: [email or web address]  Date: 11/14/2024  Prepared by: Claribel    Exercises  - Hooklying Transversus Abdominis Palpation  - 1 x daily - 2 sets - 10 reps  - Supine Hip Adduction Isometric with Ball  - 1 x daily - 2 sets - 10 reps  - Hooklying Clamshell with Resistance  - 1 x daily - 2 sets - 10 reps  - Seated Figure 4 Stretch  - 3 reps - 30 seconds hold    Access Code: [email or web address]  URL: [email or web address]  Date: 11/14/2024  Prepared by: Claribel    Exercises  - Hooklying Transversus Abdominis Palpation  - 1 x daily - 2 sets - 10 reps  - Supine Hip Adduction Isometric with Ball  - 1 x daily - 2 sets - 10 reps  - Hooklying Clamshell with Resistance  - 1 x daily - 2 sets - 10 reps     Access Code: [email or web address]  URL: [email or web address]  Date: 11/06/2024  Prepared by: Claribel     Exercises  - Seated Correct Posture   - Lying Prone  - 1-2 x daily - 3-5 minutes hold  - Prone Press Up On Elbows  - 1-2 x daily - 2 sets - 10 reps  - Supine Quadriceps Stretch with Strap on Table  - 1-2 x daily - 3 reps - 30 seonds hold       Goals:  Active       PT Problem       Patient will resume exercise without adverse reaction       Start:  11/06/24    Expected End:  01/15/25            Patient will walk, sit, transfer with pain no greater than 2/10 at worst, no pain on average       Start:  11/06/24     Expected End:  01/15/25            Patient will improve strength by >=1/3 MMT to increase ease with daily functional mobility and assist with reduction of pain levels       Start:  11/06/24    Expected End:  01/15/25            Patient will demonstrate independence and compliance with HEP and self management        Start:  11/06/24    Expected End:  01/15/25            Patient will demonstrate a 10 % improvement on Modified Oswestry to demonstrate increased functional mobility       Start:  11/06/24    Expected End:  01/15/25

## 2024-12-12 ENCOUNTER — OFFICE VISIT (OUTPATIENT)
Dept: ORTHOPEDIC SURGERY | Facility: CLINIC | Age: 54
End: 2024-12-12
Payer: COMMERCIAL

## 2024-12-12 VITALS — HEIGHT: 67 IN | WEIGHT: 167 LBS | BODY MASS INDEX: 26.21 KG/M2

## 2024-12-12 DIAGNOSIS — M54.50 CHRONIC MIDLINE LOW BACK PAIN WITHOUT SCIATICA: ICD-10-CM

## 2024-12-12 DIAGNOSIS — M62.89 EXERCISE-INDUCED LEG FATIGUE: Primary | ICD-10-CM

## 2024-12-12 DIAGNOSIS — M53.3 SACROILIAC DYSFUNCTION: ICD-10-CM

## 2024-12-12 DIAGNOSIS — G89.29 CHRONIC MIDLINE LOW BACK PAIN WITHOUT SCIATICA: ICD-10-CM

## 2024-12-12 PROCEDURE — 99214 OFFICE O/P EST MOD 30 MIN: CPT | Performed by: PEDIATRICS

## 2024-12-12 PROCEDURE — 1036F TOBACCO NON-USER: CPT | Performed by: PEDIATRICS

## 2024-12-12 PROCEDURE — 3008F BODY MASS INDEX DOCD: CPT | Performed by: PEDIATRICS

## 2024-12-12 ASSESSMENT — PAIN SCALES - GENERAL: PAINLEVEL_OUTOF10: 4

## 2024-12-12 NOTE — PATIENT INSTRUCTIONS
SI joint injection:  Dr. Minor Zaidi  [phone]  Dr. Brewer [phone]    Inflammatory lab evaluation    Consider LS and pelvis MRI

## 2024-12-12 NOTE — PROGRESS NOTES
Chief Complaint   Patient presents with    Lower Back - Follow-up, Pain     Consulting physician: Yvette Maria MD    A report with my findings and recommendations will be sent to the primary and referring physician via written or electronic means when information is available    History of Present Illness:  Bekah Nicholson is a 54 y.o. female who presented on 9/18/24  with back pain, R SI joint dysfunction.  History of R low back pain R SI joint pain since April 2024 when hiking and felt a pop. On exam pain R SI with ROM, poor LE flexibility and poor core strength, TTP R SI, R QL, R gluteus medius muscle, tendon.    Xrays  Degenerative disc disease noted.  We discussed the importance of gluteal & hip strengthening to offer stability to SI and lumbar spine.  Physical therapy exercises with good technique  encouraged with patient goal to perform 4-5 days weekly. 9/18/24, 9/26/24 OMM performed.  ATC taught additional exercises to address piriformis, lumbar rotation, gluteal activation, hip abduction..  We discussed use of foam roller, stretching and proper hydration following OMM.  Follow up 2 weeks.     April 2024 when hiking.  [place] pop R SI area.  Worse with: changing motion, getting out of the car, laying on her back.  She has maintained activity walking and performing low back exercises, squats, LE stretching 3 - 4 days weekly.  Light activity.  Has introduced some light weight lifiting. Cautious as she does not want to hurt her back further. No numbness or tingling.  No radiation of pain.  No previous LBP, images / xrays.  Has not worked with physical therapy.  Pain R low back/SI area, focal.  Annoying.  Feels like it needs to pop.    9/26/24 Some improvement after OMM. Performed some weight lifting.  She has been avoiding crossing her legs.  She is stretching daily.  Introduced squats and gluteal activation to her exercises.      10/10/24  No improvement in R posterior hip pain or R SI pain. She continues to  "report tightness posterior R hip through R SI area.  Feels like it needs to pop.  She has been performing home exercise program without improvement.  Occasional numbness to dorsum R foot especially when driving, sometimes walking.      2024  Overall, Bekah feels like her pain in the right SI joint and lower lumbar spine remains about the same, if not slightly worse. She has been going to PT once a week and doing home exercises daily, and these have not helped her at all. She continues to endorse a sensation of needing something to pop. She is unable to exercise like she wants to because of the pain. She has numbness and tingling going down her right leg when driving, which has been a chronic issue. She has tried taking Ibuprofen at night to help her sleep, which hasn't significantly helped. Occasionally, the pain wakes her up at night.    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     Past MSK HX:  Specialty Problems    None     ROS  12 point ROS reviewed and is negative except for items listed   Weight gain, glasses, snoring.  Menarche age 13. .  LMP Spring 2024    Social Hx:  Home:  Lives with , daughter Elan    Medications:   No current outpatient medications on file prior to visit.     No current facility-administered medications on file prior to visit.     Allergies:    Allergies   Allergen Reactions    Penicillins Rash     Peeling rash, arthralgias.      Physical Exam:    Visit Vitals  Ht 1.702 m (5' 7\")   Wt 75.8 kg (167 lb)   BMI 26.16 kg/m²   OB Status Perimenopausal   Smoking Status Never   BSA 1.89 m²      Vitals reviewed    General appearance: Well-appearing well-nourished  Psych: Normal mood and affect    Neuro: Normal sensation to light touch throughout the involved extremities  Vascular: No extremity edema or discoloration.  Skin: negative.  Lymphatic: no regional lymphadenopathy present.  Eyes: no conjunctival " injection.    LUMBAR SPINE EXAM:    Visual Inspection:   Normal posture   Scoliosis: none   Deformity: none   Pelvic obliquity: none    Range of motion:  Forward flexion (90) Full, pain RSI  Extension (30) Full, pain RSI and midline lumbar spine L4-L5  Lateral bend right (30) Full, pain RSI  Lateral bend Left (30) Full, pain free RSI  Lateral rotation right (45) Full, pain RSI  Lateral rotation left (45) Full, pain RSI    Hip flexion supine: (140) Full, pain RSI  Hip extension (prone) (15) Full, pain free  Hip abduction (45) Full, pain free  Hip adduction (30-45) Full, pain free  Hip IR at 90 flexion (40) Full, pain RSI  Hip ER at 90 Flexion(40-50) Full, pain RSI    Palpation:   TTP the midline / spinous process L4-L5  TTP paraspinous musculature RSI  TTP posterior superior iliac spine R  TTP ischial tuberosities no pain  TTP gluteus musculature R over the gluteal fossa  TTP SI joint R  TTP greater trochanter no pain  TTP hip joint line no pain   TTP Abdomen/no abd masses no pain    Strength:  Great toe (L5) pain free, 5/5  Ankle inversion (L4/5) pain free, 5/5  Ankle eversion (L5,S1) pain free, 5/5  Ankle Dorsiflexion (L4/5) pain free, 5/5  Ankle plantarflexion (S1/2) pain free, 5/5  Knee extension (L3/4) pain free, 5/5  Knee flexion (L5,S1)  pain free, 5/5  Hip flexion (L2/3) pain RSI, 5/5  Hip Extension (L4,5) pain free, 5/5  Hip aduction (L4/5) pain free, 5/5  Hip adduction (L2,3)  pain free, 5/5    Special Tests:  Stork test: positive bilaterally L4-L5  Sphinx test: positive L4-L5  Straight leg raise: RSI pain with SLR on both sides but no radicular symptoms  Seated slump test: negative  FADIR: pain RSI  ELISEO: pain RSI    Trendelenburg: none  SL squats: no pain    Neuro:  Clonus: none  Sensation:   Nl sensation to light touch L5: interspace great toe  Nl sensation to light touch S1: small toe   Nl sensation to light touch L4 lateral border great toe    Flexibility:  Popliteal angle: 170  Quad heel to butt: 4  inch  Deon test L: +  Deon test R: +    Gait normal     Imaging:  Lumbosacral x-rays 9/18/2024 reviewed. Degenerative disc disease noted.  Bilateral Hip and AP pelvis x-rays 10/10/2024 reviewed. Bilateral CAM deformity.  Imaging was personally interpreted and reviewed with the patient and/or family    Impression and Plan:  Bekah Nicholson is a 54 y.o. female who presented on 9/18/24 with back pain, R SI joint dysfunction.  History of R low back pain R SI joint pain since April 2024 when hiking and felt a pop. On exam pain R SI with ROM, poor LE flexibility and poor core strength, TTP R SI, R QL, R gluteus medius muscle, tendon.    Xrays  Degenerative disc disease noted.  We discussed the importance of gluteal & hip strengthening to offer stability to SI and lumbar spine.  Physical therapy exercises with good technique  encouraged with patient goal to perform 4-5 days weekly. 9/18/24, 9/26/24 OMM performed.  ATC taught additional exercises to address piriformis, lumbar rotation, gluteal activation, hip abduction. We discussed use of foam roller, stretching and proper hydration following OMM.  Follow up 2 weeks.     10/10/24  Physical therapy requisition provided.  Javy Dowell if [facility] is not available.  Begin stability and strengthening program twice weekly with home program daily.  If no improvement in 4 weeks, we will consider MRI Lumbar spine v R hip to evaluate for labral tear.     12/12/2024  Continues to have both R SI and lumbar midline back pain that has not improved despite anti-inflammatories, PT, HEP, and OMM. Exam today with RSI pain with range of motion testing and hip flexion/IR/ER, as well as midline L4-L5 pain with extension. TTP R SI and PSIS, L4-L5 midline, and R gluteal fossa. Positive Stork and Sphinx tests. SLR/ELISEO/FADIR elicit RSI pain. Given ongoing unimproved pain, will order labs (CBC, ESR, CRP, HLA-B27) to evaluate for underlying inflammatory conditions (such as  ankylosing spondylitis) that could cause her SI joint pain; she will get these drawn with her routine labs with her PCP in January. We also provided several providers that she can see to consider an SI joint injection for pain relief. We discussed obtaining an MRI of the lumbar spine and an MRI of the pelvis to better evaluate both her lower lumbar midline back pain and right SI joint pain; Bekah expressed she is hesitant about getting an MRI because of the loud noise. She will consider having the MRI done; depending on her lab work, an MRI may also be warranted, which she understsands. Will follow-up after labs and/or MRI are completed.    Lucio Kohli MD, MEd  Primary Care Sports Medicine Fellow, PGY-4  [facility]    ** Please excuse any errors in grammar or translation related to this dictation. Voice recognition software was utilized to prepare this document. **

## 2024-12-13 ENCOUNTER — TREATMENT (OUTPATIENT)
Dept: PHYSICAL THERAPY | Facility: CLINIC | Age: 54
End: 2024-12-13
Payer: COMMERCIAL

## 2024-12-13 DIAGNOSIS — M25.551 HIP PAIN, ACUTE, RIGHT: ICD-10-CM

## 2024-12-13 DIAGNOSIS — M54.50 ACUTE RIGHT-SIDED LOW BACK PAIN WITHOUT SCIATICA: Primary | ICD-10-CM

## 2024-12-13 PROCEDURE — 97110 THERAPEUTIC EXERCISES: CPT | Mod: GP

## 2024-12-13 NOTE — PROGRESS NOTES
Physical Therapy    Physical Therapy Treatment    Patient Name: Bekah Nicholson  MRN: [id number]  Today's Date: 12/13/2024  Time Calculation  Start Time: 0750  Stop Time: 0830  Time Calculation (min): 40 min     PT Therapeutic Procedures Time Entry  Therapeutic Exercise Time Entry: 40        Insurance: [employer]  NO AUTH/ $700 DED (MET)/ 80% C[place]AGE/ [place]IS[place]/ $8000 OOP (NM)/ PER [facility] SITE REF#[id number]  Visit # 4    Assessment:  Progressed with therapeutic exercises, added in MET. Pain slightly lower at end of treatment to 2/10. Plan to assess between session response at next visit.     Plan:  Continue per initial PT POC. Assess between session response next visit.    Current Problem  1. Acute right-sided low back pain without sciatica  Follow Up In Physical Therapy      2. Hip pain, acute, right  Follow Up In Physical Therapy            Problem List Items Addressed This Visit             ICD-10-CM    Acute right-sided low back pain without sciatica - Primary M54.50    Hip pain, acute, right M25.551       General   **acute R sided low back pain, R hip pain who presents with chronic, constant pain levels with tenderness upon palpation R PSIS      Subjective    Some days it's getting better, some days it's not.  HEP daily.  Cavitation felt better last time, but didn't last long. Pain 3/10 at worst in the past couple days    Pain  2-3/10 R PSIS/gluteal achy     Objective     Treatments:  Therapeutic Exercise: Education on posture, positioning with use of lumbar roll, modification or discontinuation of any exercise that increases symptoms (i.e.peripheralization of symptoms).  Scifit stepper S20 x 5 mins   Step stretch x 10 reps ea   Shuttle DL L5 2 x 15 reps, SL L3 2 x 15 reps  Seated figure 4 stretch 3 x 30 sec  Off mat R hip extension/hip flexor stretch 3 x 30 sec  Self MET R hamstring isometric, L hip flexor isometric- hooklying, sitting for patient comfort  Hooklying TA x 10 reps  Hooklying TA with hip adduction  2 x 10 reps  Sitting TA with hip clamshell Green TB 2 x 10 reps       OP Education: verbal, demonstration, HEP handout  Off mat hip flexor stretch    Access Code: [email or web address]  URL: [email or web address]  Date: 12/13/2024  Prepared by: Claribel    Exercises  - Hooklying Sacroiliac Joint Isometric (Mirrored)  - 1 x daily - 7 x weekly - 2 sets - 10 reps    Access Code: [email or web address]  URL: [email or web address]  Date: 11/14/2024  Prepared by: Claribel    Exercises  - Hooklying Transversus Abdominis Palpation  - 1 x daily - 2 sets - 10 reps  - Supine Hip Adduction Isometric with Ball  - 1 x daily - 2 sets - 10 reps  - Hooklying Clamshell with Resistance  - 1 x daily - 2 sets - 10 reps  - Seated Figure 4 Stretch  - 3 reps - 30 seconds hold    Access Code: [email or web address]  URL: [email or web address]  Date: 11/14/2024  Prepared by: Claribel    Exercises  - Hooklying Transversus Abdominis Palpation  - 1 x daily - 2 sets - 10 reps  - Supine Hip Adduction Isometric with Ball  - 1 x daily - 2 sets - 10 reps  - Hooklying Clamshell with Resistance  - 1 x daily - 2 sets - 10 reps     Access Code: [email or web address]  URL: [email or web address]  Date: 11/06/2024  Prepared by: Claribel     Exercises  - Seated Correct Posture   - Lying Prone  - 1-2 x daily - 3-5 minutes hold  - Prone Press Up On Elbows  - 1-2 x daily - 2 sets - 10 reps  - Supine Quadriceps Stretch with Strap on Table  - 1-2 x daily - 3 reps - 30 seonds hold       Goals:  Active       PT Problem       Patient will resume exercise without adverse reaction       Start:  11/06/24    Expected End:  01/15/25            Patient will walk, sit, transfer with pain no greater than 2/10 at worst, no pain on average       Start:  11/06/24    Expected End:  01/15/25            Patient will improve strength by >=1/3 MMT to increase ease with daily functional mobility and assist with reduction of pain levels        Start:  11/06/24    Expected End:  01/15/25            Patient will demonstrate independence and compliance with HEP and self management        Start:  11/06/24    Expected End:  01/15/25            Patient will demonstrate a 10 % improvement on Modified Oswestry to demonstrate increased functional mobility       Start:  11/06/24    Expected End:  01/15/25

## 2024-12-19 ENCOUNTER — TREATMENT (OUTPATIENT)
Dept: PHYSICAL THERAPY | Facility: CLINIC | Age: 54
End: 2024-12-19
Payer: COMMERCIAL

## 2024-12-19 DIAGNOSIS — M54.50 ACUTE RIGHT-SIDED LOW BACK PAIN WITHOUT SCIATICA: ICD-10-CM

## 2024-12-19 DIAGNOSIS — M25.551 HIP PAIN, ACUTE, RIGHT: ICD-10-CM

## 2024-12-19 PROCEDURE — 97110 THERAPEUTIC EXERCISES: CPT | Mod: GP

## 2024-12-19 NOTE — PROGRESS NOTES
Physical Therapy    Physical Therapy Treatment    Patient Name: Bekah Nicholson  MRN: [id number]  Today's Date: 12/19/2024  Time Calculation  Start Time: 0406  Stop Time: 0445  Time Calculation (min): 39 min     PT Therapeutic Procedures Time Entry  Manual Therapy Time Entry: 2  Therapeutic Exercise Time Entry: 37        Insurance: [employer]  NO AUTH/ $700 DED (MET)/ 80% COVERAGE/ [place] VISITS/ $8000 OOP (NM)/ PER [facility] SITE REF#[id number]  Visit # 5    Assessment:  Favorable response with MET and manual therapy; therefore, continued this date and progressed therapeutic exercises without adverse reaction. 1-2/10 pain level at end of treatment.    Plan:  Continue per initial PT POC.  Progress strengthening and mobility as able, continue with manual therapy as needed.    Current Problem  1. Acute right-sided low back pain without sciatica  Follow Up In Physical Therapy      2. Hip pain, acute, right  Follow Up In Physical Therapy          Problem List Items Addressed This Visit             ICD-10-CM    Acute right-sided low back pain without sciatica M54.50    Hip pain, acute, right M25.551       General   **acute R sided low back pain, R hip pain who presents with chronic, constant pain levels    Subjective    HEP daily. New ones feel better.   Noticing less pain getting out of the car.  Not as bad past couple of days.    Pain  1-2/10 R PSIS/gluteal region    Objective     Treatments:  Therapeutic Exercise: Education on posture, positioning with use of lumbar roll, modification or discontinuation of any exercise that increases symptoms (i.e.peripheralization of symptoms).  Scifit stepper S21 L2 x 5 mins ROM/warm-up/strength  Step stretch 2 x 10 reps ea   Shuttle DL L5 2 x 15 reps, SL L3 2 x 15 reps  Seated figure 4 stretch 3 x 30 sec  Off mat R hip extension/hip flexor stretch 3 x 30 sec  Self MET R hamstring isometric, L hip flexor isometric- hooklying, sitting for patient comfort  Hooklying TA x 10 reps  Hooklying TA  with hip adduction 2 x 15 reps  Sitting TA with hip clamshell Blue TB 2 x 10 reps    Manual Therapy:  Sidelying sacroiliac mobilization grade V  Audible cavitation noted     OP Education: verbal, demonstration + blue TB    Access Code: [email or web address]  URL: [email or web address]://[email or web address]/  Date: 12/13/2024  Prepared by: Claribel    Exercises  - Hooklying Sacroiliac Joint Isometric (Mirrored)  - 1 x daily - 7 x weekly - 2 sets - 10 reps    Access Code: [email or web address]  URL: https://[email or web address]/  Date: 11/14/2024  Prepared by: Claribel    Exercises  - Hooklying Transversus Abdominis Palpation  - 1 x daily - 2 sets - 10 reps  - Supine Hip Adduction Isometric with Ball  - 1 x daily - 2 sets - 10 reps  - Hooklying Clamshell with Resistance  - 1 x daily - 2 sets - 10 reps  - Seated Figure 4 Stretch  - 3 reps - 30 seconds hold    Access Code: [email or web address]  URL: https://[employer]/  Date: 11/14/2024  Prepared by: Claribel    Exercises  - Hooklying Transversus Abdominis Palpation  - 1 x daily - 2 sets - 10 reps  - Supine Hip Adduction Isometric with Ball  - 1 x daily - 2 sets - 10 reps  - Hooklying Clamshell with Resistance  - 1 x daily - 2 sets - 10 reps     Access Code: [email or web address]  URL: https://[employer]/  Date: 11/06/2024  Prepared by: Claribel     Exercises  - Seated Correct Posture   - Lying Prone  - 1-2 x daily - 3-5 minutes hold  - Prone Press Up On Elbows  - 1-2 x daily - 2 sets - 10 reps  - Supine Quadriceps Stretch with Strap on Table  - 1-2 x daily - 3 reps - 30 seonds hold       Goals:  Active       PT Problem       Patient will resume exercise without adverse reaction       Start:  11/06/24    Expected End:  01/15/25            Patient will walk, sit, transfer with pain no greater than 2/10 at worst, no pain on average       Start:  11/06/24    Expected End:  01/15/25            Patient will improve strength by >=1/3 MMT to increase ease with  daily functional mobility and assist with reduction of pain levels       Start:  11/06/24    Expected End:  01/15/25            Patient will demonstrate independence and compliance with HEP and self management        Start:  11/06/24    Expected End:  01/15/25            Patient will demonstrate a 10 % improvement on Modified Oswestry to demonstrate increased functional mobility       Start:  11/06/24    Expected End:  01/15/25

## 2025-01-09 ENCOUNTER — TREATMENT (OUTPATIENT)
Dept: PHYSICAL THERAPY | Facility: CLINIC | Age: 55
End: 2025-01-09
Payer: COMMERCIAL

## 2025-01-09 DIAGNOSIS — M25.551 HIP PAIN, ACUTE, RIGHT: ICD-10-CM

## 2025-01-09 DIAGNOSIS — M54.50 ACUTE RIGHT-SIDED LOW BACK PAIN WITHOUT SCIATICA: Primary | ICD-10-CM

## 2025-01-09 PROCEDURE — 97110 THERAPEUTIC EXERCISES: CPT | Mod: GP

## 2025-01-09 NOTE — PROGRESS NOTES
Physical Therapy    Physical Therapy Treatment    Patient Name: Bekah Nicholson  MRN: [id number]  Today's Date: 1/9/2025  Time Calculation  Start Time: 1619  Stop Time: 1700  Time Calculation (min): 41 min     PT Therapeutic Procedures Time Entry  Therapeutic Exercise Time Entry: 41        Insurance: [employer]  NO AUTH/ $700 DED (MET)/ 80% COVERAGE/ [place] VISITS/ $8000 OOP (NM)/ PER [facility] SITE REF#[id number]  Visit # 6    Assessment:  Patient returns after pause in therapy due to sickness. Resumed therapeutic exercises without adverse reaction. Plan to continue towards all PT goals.    Plan:  Continue per initial PT POC.  Progress strengthening and mobility as able, continue with manual therapy as needed.    Current Problem  1. Acute right-sided low back pain without sciatica  Follow Up In Physical Therapy      2. Hip pain, acute, right  Follow Up In Physical Therapy          Problem List Items Addressed This Visit             ICD-10-CM    Acute right-sided low back pain without sciatica - Primary M54.50    Hip pain, acute, right M25.551       General   **acute R sided low back pain, R hip pain who presents with chronic, constant pain levels    Subjective    Not where it was, pain definitely better.  1-2/10 at worst in the past week.  Noticing less pain getting out of the car and in the bed.  Doing HEP since recent sickness.    Pain  0/10 R PSIS/gluteal region    Objective     Treatments:  Therapeutic Exercise: Education on posture, positioning with use of lumbar roll, modification or discontinuation of any exercise that increases symptoms (i.e.peripheralization of symptoms).  Scifit stepper S21 L2 x 5 mins ROM/warm-up/strength  Step stretch 2 x 10 reps ea   Shuttle DL L5 2 x 15 reps, SL L3 2 x 15 reps  Seated figure 4 stretch 3 x 30 sec  Off mat R hip extension/hip flexor stretch 3 x 30 sec  Self MET R hamstring isometric, L hip flexor isometric- hooklying, reviewed sitting as needed for patient comfort   Hooklying TA x  15 reps  Hooklying TA with hip adduction 2 x 15 reps  Sitting TA with hip clamshell Blue TB 2 x 15 reps    OP Education: verbal, demonstration    Access Code: [email or web address]  URL: [email or web address]://[employer]/  Date: 12/13/2024  Prepared by: Claribel    Exercises  - Hooklying Sacroiliac Joint Isometric (Mirrored)  - 1 x daily - 7 x weekly - 2 sets - 10 reps    Access Code: [email or web address]  URL: https://[employer]/  Date: 11/14/2024  Prepared by: Claribel    Exercises  - Hooklying Transversus Abdominis Palpation  - 1 x daily - 2 sets - 10 reps  - Supine Hip Adduction Isometric with Ball  - 1 x daily - 2 sets - 10 reps  - Hooklying Clamshell with Resistance  - 1 x daily - 2 sets - 10 reps  - Seated Figure 4 Stretch  - 3 reps - 30 seconds hold    Access Code: [email or web address]  URL: https://[employer]AT/  Date: 11/14/2024  Prepared by: Claribel    Exercises  - Hooklying Transversus Abdominis Palpation  - 1 x daily - 2 sets - 10 reps  - Supine Hip Adduction Isometric with Ball  - 1 x daily - 2 sets - 10 reps  - Hooklying Clamshell with Resistance  - 1 x daily - 2 sets - 10 reps     Access Code: [email or web address]  URL: https://[employer]/  Date: 11/06/2024  Prepared by: Claribel     Exercises  - Seated Correct Posture   - Lying Prone  - 1-2 x daily - 3-5 minutes hold  - Prone Press Up On Elbows  - 1-2 x daily - 2 sets - 10 reps  - Supine Quadriceps Stretch with Strap on Table  - 1-2 x daily - 3 reps - 30 seonds hold       Goals:  Active       PT Problem       Patient will resume exercise without adverse reaction       Start:  11/06/24    Expected End:  01/15/25            Patient will walk, sit, transfer with pain no greater than 2/10 at worst, no pain on average       Start:  11/06/24    Expected End:  01/15/25            Patient will improve strength by >=1/3 MMT to increase ease with daily functional mobility and assist with reduction of pain levels       Start:   11/06/24    Expected End:  01/15/25            Patient will demonstrate independence and compliance with HEP and self management        Start:  11/06/24    Expected End:  01/15/25            Patient will demonstrate a 10 % improvement on Modified Oswestry to demonstrate increased functional mobility       Start:  11/06/24    Expected End:  01/15/25                 Post-Care Instructions: I reviewed with the patient in detail post-care instructions. Patient is to keep the biopsy site dry overnight, and then apply bacitracin twice daily until healed. Patient may apply hydrogen peroxide soaks to remove any crusting.

## 2025-01-10 ENCOUNTER — OFFICE VISIT (OUTPATIENT)
Dept: PRIMARY CARE | Facility: CLINIC | Age: 55
End: 2025-01-10
Payer: COMMERCIAL

## 2025-01-10 VITALS
DIASTOLIC BLOOD PRESSURE: 82 MMHG | BODY MASS INDEX: 26.94 KG/M2 | HEART RATE: 78 BPM | SYSTOLIC BLOOD PRESSURE: 124 MMHG | WEIGHT: 172 LBS | OXYGEN SATURATION: 98 % | TEMPERATURE: 97.9 F

## 2025-01-10 DIAGNOSIS — Z00.00 HEALTH MAINTENANCE EXAMINATION: ICD-10-CM

## 2025-01-10 DIAGNOSIS — J06.9 UPPER RESPIRATORY TRACT INFECTION, UNSPECIFIED TYPE: Primary | ICD-10-CM

## 2025-01-10 PROCEDURE — 99213 OFFICE O/P EST LOW 20 MIN: CPT | Performed by: FAMILY MEDICINE

## 2025-01-10 PROCEDURE — 1036F TOBACCO NON-USER: CPT | Performed by: FAMILY MEDICINE

## 2025-01-10 ASSESSMENT — ENCOUNTER SYMPTOMS
RHINORRHEA: 1
SORE THROAT: 0
HEARTBURN: 0
HEADACHES: 1
WHEEZING: 0
CHILLS: 1
HEMOPTYSIS: 0
COUGH: 1
SHORTNESS OF BREATH: 1
FEVER: 0
SWEATS: 1
WEIGHT LOSS: 0
MYALGIAS: 0

## 2025-01-10 ASSESSMENT — PATIENT HEALTH QUESTIONNAIRE - PHQ9
1. LITTLE INTEREST OR PLEASURE IN DOING THINGS: NOT AT ALL
2. FEELING DOWN, DEPRESSED OR HOPELESS: NOT AT ALL
SUM OF ALL RESPONSES TO PHQ9 QUESTIONS 1 AND 2: 0

## 2025-01-10 NOTE — PROGRESS NOTES
"Subjective   Patient ID: Bekah Nicholson is a 55 y.o. female who presents for Cough, Fatigue, and chest feels heavy.  HPI  Been sick x 11 days - started as nasal congestion, cough, and headache, those symptoms have resolved, now \"sitting in my chest\", cold causes her to cough, feels a bit short of breath, fatigued   No fever  Objective   /82 (BP Location: Right arm)   Pulse 78   Temp 36.6 °C (97.9 °F) (Oral)   Wt 78 kg (172 lb)   SpO2 98%   BMI 26.94 kg/m²    Physical Exam  Genl: Mildly ill appearing    HEENT: Conj and sclera clear. EACs and TMs clear. Normal nares. Oropharynx clear.     Neck: supple, no LAD    CVS RRR, no murmurs    Resp good exchange with no ronchi or rales     Assessment/Plan   Diagnoses and all orders for this visit:  Upper respiratory tract infection, unspecified type  Health maintenance examination  -     Tsh With Reflex To Free T4 If Abnormal; Future  -     Comprehensive metabolic panel; Future  -     Lipid panel; Future  -     CBC and Auto Differential; Future    Continue with symptomatic treatment; to call if persists      Yvette Maria MD  Family Medicine   [facility]  "

## 2025-01-24 ENCOUNTER — TREATMENT (OUTPATIENT)
Dept: PHYSICAL THERAPY | Facility: CLINIC | Age: 55
End: 2025-01-24
Payer: COMMERCIAL

## 2025-01-24 DIAGNOSIS — M54.50 ACUTE RIGHT-SIDED LOW BACK PAIN WITHOUT SCIATICA: Primary | ICD-10-CM

## 2025-01-24 DIAGNOSIS — M25.551 HIP PAIN, ACUTE, RIGHT: ICD-10-CM

## 2025-01-24 PROCEDURE — 97110 THERAPEUTIC EXERCISES: CPT | Mod: GP

## 2025-01-24 NOTE — PROGRESS NOTES
Physical Therapy    Physical Therapy Treatment    Patient Name: Bekah Nicholson  MRN: [id number]  Today's Date: 1/24/2025  Time Calculation  Start Time: 1235  Stop Time: 1315  Time Calculation (min): 40 min     PT Therapeutic Procedures Time Entry  Therapeutic Exercise Time Entry: 40        Insurance: [employer]  NO AUTH/ $700 DED (MET)/ 80% COVERAGE/ [place] VISITS/ $8000 OOP (NM)/ PER [facility] SITE REF#[id number]  Visit # 7    Assessment:  Addition of black theraband for increased resistance without adverse reaction. Focus on motor control during functional sit to stand transfers, due to valgus positioning. Good return demonstration within session; will ensure Good carry over at next visit. No change in pain reported during treatment.    Plan:  Goal review next.    Current Problem  1. Acute right-sided low back pain without sciatica  Follow Up In Physical Therapy      2. Hip pain, acute, right  Follow Up In Physical Therapy            Problem List Items Addressed This Visit             ICD-10-CM    Acute right-sided low back pain without sciatica - Primary M54.50    Hip pain, acute, right M25.551       General   **acute R sided low back pain, R hip pain who presents with chronic, constant pain levels    Subjective    More of a stiffness vs pain  Continues to notice less pain getting out of the car and in the bed.  Doing HEP since recent sickness.    Pain  1/10 R PSIS/gluteal region stiffness    Objective     Treatments:  Therapeutic Exercise: Education on posture, positioning with use of lumbar roll, modification or discontinuation of any exercise that increases symptoms (i.e.peripheralization of symptoms).  Scifit stepper S21 L2 x 5 mins ROM/warm-up/strength  Step stretch 2 x 10 reps ea   Shuttle DL L5 2 x 15 reps, SL L3 2 x 15 reps  Seated figure 4 stretch 3 x 30 sec  Off mat R hip extension/hip flexor stretch 3 x 30 sec  Self MET R hamstring isometric, L hip flexor isometric- hooklying, reviewed sitting as needed for  patient comfort   Hooklying TA with hip adduction 2 x 15 reps  Sitting TA with hip clamshell Black TB 3 x 10 reps  Functional sit to stand transfer x 10 reps with cues to avoid valgus positioning    OP Education: verbal, demonstration    Access Code: [email or web address]  URL: [email or web address]://[email or web address]/  Date: 12/13/2024  Prepared by: Claribel    Exercises  - Hooklying Sacroiliac Joint Isometric (Mirrored)  - 1 x daily - 7 x weekly - 2 sets - 10 reps    Access Code: [email or web address]  URL: https://[email or web address]/  Date: 11/14/2024  Prepared by: Claribel    Exercises  - Hooklying Transversus Abdominis Palpation  - 1 x daily - 2 sets - 10 reps  - Supine Hip Adduction Isometric with Ball  - 1 x daily - 2 sets - 10 reps  - Hooklying Clamshell with Resistance  - 1 x daily - 2 sets - 10 reps  - Seated Figure 4 Stretch  - 3 reps - 30 seconds hold    Access Code: [email or web address]  URL: [email or web address]://[email or web address]/  Date: 11/14/2024  Prepared by: Claribel    Exercises  - Hooklying Transversus Abdominis Palpation  - 1 x daily - 2 sets - 10 reps  - Supine Hip Adduction Isometric with Ball  - 1 x daily - 2 sets - 10 reps  - Hooklying Clamshell with Resistance  - 1 x daily - 2 sets - 10 reps     Access Code: [email or web address]  URL: [email or web address]://[email or web address]/  Date: 11/06/2024  Prepared by: Claribel     Exercises  - Seated Correct Posture   - Lying Prone  - 1-2 x daily - 3-5 minutes hold  - Prone Press Up On Elbows  - 1-2 x daily - 2 sets - 10 reps  - Supine Quadriceps Stretch with Strap on Table  - 1-2 x daily - 3 reps - 30 seonds hold       Goals:  Active       PT Problem       Patient will resume exercise without adverse reaction       Start:  11/06/24    Expected End:  01/15/25            Patient will walk, sit, transfer with pain no greater than 2/10 at worst, no pain on average       Start:  11/06/24    Expected End:  01/15/25            Patient will improve strength by >=1/3 MMT to  increase ease with daily functional mobility and assist with reduction of pain levels       Start:  11/06/24    Expected End:  01/15/25            Patient will demonstrate independence and compliance with HEP and self management        Start:  11/06/24    Expected End:  01/15/25            Patient will demonstrate a 10 % improvement on Modified Oswestry to demonstrate increased functional mobility       Start:  11/06/24    Expected End:  01/15/25

## 2025-02-06 ENCOUNTER — TREATMENT (OUTPATIENT)
Dept: PHYSICAL THERAPY | Facility: CLINIC | Age: 55
End: 2025-02-06
Payer: COMMERCIAL

## 2025-02-06 DIAGNOSIS — M25.551 HIP PAIN, ACUTE, RIGHT: ICD-10-CM

## 2025-02-06 DIAGNOSIS — M54.50 ACUTE RIGHT-SIDED LOW BACK PAIN WITHOUT SCIATICA: Primary | ICD-10-CM

## 2025-02-06 LAB
ALBUMIN SERPL-MCNC: 4.6 G/DL (ref 3.6–5.1)
ALP SERPL-CCNC: 52 U/L (ref 37–153)
ALT SERPL-CCNC: 14 U/L (ref 6–29)
ANION GAP SERPL CALCULATED.4IONS-SCNC: 8 MMOL/L (CALC) (ref 7–17)
AST SERPL-CCNC: 14 U/L (ref 10–35)
BASOPHILS # BLD AUTO: 28 CELLS/UL (ref 0–200)
BASOPHILS NFR BLD AUTO: 0.6 %
BILIRUB SERPL-MCNC: 0.7 MG/DL (ref 0.2–1.2)
BUN SERPL-MCNC: 18 MG/DL (ref 7–25)
CALCIUM SERPL-MCNC: 9.4 MG/DL (ref 8.6–10.4)
CHLORIDE SERPL-SCNC: 104 MMOL/L (ref 98–110)
CHOLEST SERPL-MCNC: 258 MG/DL
CHOLEST/HDLC SERPL: 3.4 (CALC)
CO2 SERPL-SCNC: 27 MMOL/L (ref 20–32)
CREAT SERPL-MCNC: 0.76 MG/DL (ref 0.5–1.03)
EGFRCR SERPLBLD CKD-EPI 2021: 92 ML/MIN/1.73M2
EOSINOPHIL # BLD AUTO: 69 CELLS/UL (ref 15–500)
EOSINOPHIL NFR BLD AUTO: 1.5 %
ERYTHROCYTE [DISTWIDTH] IN BLOOD BY AUTOMATED COUNT: 12.6 % (ref 11–15)
GLUCOSE SERPL-MCNC: 90 MG/DL (ref 65–99)
HCT VFR BLD AUTO: 41.1 % (ref 35–45)
HDLC SERPL-MCNC: 76 MG/DL
HGB BLD-MCNC: 13.7 G/DL (ref 11.7–15.5)
LDLC SERPL CALC-MCNC: 163 MG/DL (CALC)
LYMPHOCYTES # BLD AUTO: 1555 CELLS/UL (ref 850–3900)
LYMPHOCYTES NFR BLD AUTO: 33.8 %
MCH RBC QN AUTO: 32.2 PG (ref 27–33)
MCHC RBC AUTO-ENTMCNC: 33.3 G/DL (ref 32–36)
MCV RBC AUTO: 96.5 FL (ref 80–100)
MONOCYTES # BLD AUTO: 396 CELLS/UL (ref 200–950)
MONOCYTES NFR BLD AUTO: 8.6 %
NEUTROPHILS # BLD AUTO: 2553 CELLS/UL (ref 1500–7800)
NEUTROPHILS NFR BLD AUTO: 55.5 %
NONHDLC SERPL-MCNC: 182 MG/DL (CALC)
PLATELET # BLD AUTO: 257 THOUSAND/UL (ref 140–400)
PMV BLD REES-ECKER: 9.1 FL (ref 7.5–12.5)
POTASSIUM SERPL-SCNC: 4.5 MMOL/L (ref 3.5–5.3)
PROT SERPL-MCNC: 7 G/DL (ref 6.1–8.1)
RBC # BLD AUTO: 4.26 MILLION/UL (ref 3.8–5.1)
SODIUM SERPL-SCNC: 139 MMOL/L (ref 135–146)
TRIGL SERPL-MCNC: 85 MG/DL
TSH SERPL-ACNC: 3.19 MIU/L
WBC # BLD AUTO: 4.6 THOUSAND/UL (ref 3.8–10.8)

## 2025-02-06 PROCEDURE — 97110 THERAPEUTIC EXERCISES: CPT | Mod: GP

## 2025-02-06 NOTE — PROGRESS NOTES
Physical Therapy    Physical Therapy Treatment & Discharge    Patient Name: Bekah Nicholson  MRN: [id number]  Today's Date: 2/6/2025  Time Calculation  Start Time: 1605  Stop Time: 1645  Time Calculation (min): 40 min     PT Therapeutic Procedures Time Entry  Therapeutic Exercise Time Entry: 40        Insurance: [employer]  NO AUTH/ $700 DED (MET)/ 80% COVERAGE/ [place] VISITS/ $8000 OOP (NM)/ PER [employer] SITE REF#[id number]  Visit # 8    Assessment:  Patient progressed with PT POC and demonstrates improvement in strength, mobility, and reduction of pain, meet set PT goals. She is Indep with her HEP and recommend continuation of HEP Independently at this time.    Plan:  Discharge from PT POC. Achieved all of PT goals. Recommend continuation of HEP consistently.    Current Problem  1. Acute right-sided low back pain without sciatica        2. Hip pain, acute, right            Problem List Items Addressed This Visit             ICD-10-CM    Acute right-sided low back pain without sciatica - Primary M54.50    Hip pain, acute, right M25.551       Subjective    I did pilates this morning, I was proud of myself.  HEP consistently during the week.    Pain  0/10    Objective   Gait: Indep ambulation     Lumbar AROM:  Flexion: fingertips to toes  Extension: min limitation  Side Bend R: fingertips to joint line  Side Bend L: fingertips to joint line     Hip Strength:  Flexion: 4+/5 R, 5/5 L  Abduction: 4+/5 B  Adduction: 5/5 B  Extension: 4+/5 B     Bridge endurance: 29 seconds     Outcome Measures:  Modified Oswestry: 6% disability    Treatments:  Therapeutic Exercise:   Reassess  Reviewed/Performed:  Scifit stepper S21 L2 x 6 mins ROM/warm-up/strength  Step stretch 2 x 10 reps ea   Shuttle DL L6 2 x 15 reps, SL L4 2 x 15 reps  Off mat R hip extension/hip flexor stretch 3 x 30 sec  Self MET R hamstring isometric, L hip flexor isometric hooklying  Hooklying TA with hip adduction x 30 reps  Sitting TA with hip clamshell Black TB x 30  reps  Seated figure 4 stretch 3 x 30 sec  Functional sit to stand transfer x 10 reps    OP Education: continue with HEP Independently    Access Code: [email or web address]  URL: [email or web address]  Date: 12/13/2024  Prepared by: Claribel    Exercises  - Hooklying Sacroiliac Joint Isometric (Mirrored)  - 1 x daily - 7 x weekly - 2 sets - 10 reps    Access Code: [email or web address]  URL: [email or web address]  Date: 11/14/2024  Prepared by: Claribel    Exercises  - Hooklying Transversus Abdominis Palpation  - 1 x daily - 2 sets - 10 reps  - Supine Hip Adduction Isometric with Ball  - 1 x daily - 2 sets - 10 reps  - Hooklying Clamshell with Resistance  - 1 x daily - 2 sets - 10 reps  - Seated Figure 4 Stretch  - 3 reps - 30 seconds hold    Access Code: [email or web address]  URL: [email or web address]/  Date: 11/14/2024  Prepared by: Claribel    Exercises  - Hooklying Transversus Abdominis Palpation  - 1 x daily - 2 sets - 10 reps  - Supine Hip Adduction Isometric with Ball  - 1 x daily - 2 sets - 10 reps  - Hooklying Clamshell with Resistance  - 1 x daily - 2 sets - 10 reps     Access Code: [email or web address]  URL: [email or web address]  Date: 11/06/2024  Prepared by: Claribel     Exercises  - Seated Correct Posture   - Lying Prone  - 1-2 x daily - 3-5 minutes hold  - Prone Press Up On Elbows  - 1-2 x daily - 2 sets - 10 reps  - Supine Quadriceps Stretch with Strap on Table  - 1-2 x daily - 3 reps - 30 seonds hold       Goals:  Resolved       PT Problem       Patient will resume exercise without adverse reaction (Met)       Start:  11/06/24    Expected End:  01/15/25    Resolved:  02/06/25         Patient will walk, sit, transfer with pain no greater than 2/10 at worst, no pain on average (Met)       Start:  11/06/24    Expected End:  01/15/25    Resolved:  02/06/25         Patient will improve strength by >=1/3 MMT to increase ease with daily functional mobility and  assist with reduction of pain levels (Met)       Start:  11/06/24    Expected End:  01/15/25    Resolved:  02/06/25         Patient will demonstrate independence and compliance with HEP and self management  (Met)       Start:  11/06/24    Expected End:  01/15/25    Resolved:  02/06/25         Patient will demonstrate a 10 % improvement on Modified Oswestry to demonstrate increased functional mobility (Met)       Start:  11/06/24    Expected End:  01/15/25    Resolved:  02/06/25

## 2025-02-07 ENCOUNTER — APPOINTMENT (OUTPATIENT)
Dept: PRIMARY CARE | Facility: CLINIC | Age: 55
End: 2025-02-07
Payer: COMMERCIAL

## 2025-02-07 VITALS
HEART RATE: 88 BPM | OXYGEN SATURATION: 96 % | SYSTOLIC BLOOD PRESSURE: 124 MMHG | BODY MASS INDEX: 26.67 KG/M2 | HEIGHT: 68 IN | DIASTOLIC BLOOD PRESSURE: 82 MMHG | WEIGHT: 176 LBS

## 2025-02-07 DIAGNOSIS — Z78.0 MENOPAUSE: ICD-10-CM

## 2025-02-07 DIAGNOSIS — Z12.31 ENCOUNTER FOR SCREENING MAMMOGRAM FOR MALIGNANT NEOPLASM OF BREAST: ICD-10-CM

## 2025-02-07 DIAGNOSIS — E78.2 MIXED HYPERLIPIDEMIA: ICD-10-CM

## 2025-02-07 DIAGNOSIS — Z00.00 HEALTH MAINTENANCE EXAMINATION: Primary | ICD-10-CM

## 2025-02-07 DIAGNOSIS — Z12.4 CERVICAL CANCER SCREENING: ICD-10-CM

## 2025-02-07 DIAGNOSIS — Z12.4 ENCOUNTER FOR PAPANICOLAOU SMEAR FOR CERVICAL CANCER SCREENING: ICD-10-CM

## 2025-02-07 DIAGNOSIS — Z12.31 BREAST CANCER SCREENING BY MAMMOGRAM: ICD-10-CM

## 2025-02-07 PROCEDURE — 88175 CYTOPATH C/V AUTO FLUID REDO: CPT

## 2025-02-07 PROCEDURE — 3008F BODY MASS INDEX DOCD: CPT | Performed by: FAMILY MEDICINE

## 2025-02-07 PROCEDURE — 99396 PREV VISIT EST AGE 40-64: CPT | Performed by: FAMILY MEDICINE

## 2025-02-07 PROCEDURE — 1036F TOBACCO NON-USER: CPT | Performed by: FAMILY MEDICINE

## 2025-02-07 RX ORDER — PROGESTERONE 100 MG/1
100 CAPSULE ORAL DAILY
Qty: 30 CAPSULE | Refills: 3 | Status: SHIPPED | OUTPATIENT
Start: 2025-02-07 | End: 2026-02-07

## 2025-02-07 ASSESSMENT — PROMIS GLOBAL HEALTH SCALE
CARRYOUT_PHYSICAL_ACTIVITIES: COMPLETELY
RATE_GENERAL_HEALTH: VERY GOOD
CARRYOUT_SOCIAL_ACTIVITIES: VERY GOOD
RATE_PHYSICAL_HEALTH: VERY GOOD
RATE_MENTAL_HEALTH: GOOD
EMOTIONAL_PROBLEMS: SOMETIMES
RATE_AVERAGE_PAIN: 1
RATE_QUALITY_OF_LIFE: VERY GOOD
RATE_AVERAGE_FATIGUE: MILD
RATE_SOCIAL_SATISFACTION: VERY GOOD

## 2025-02-07 ASSESSMENT — PATIENT HEALTH QUESTIONNAIRE - PHQ9: 1. LITTLE INTEREST OR PLEASURE IN DOING THINGS: NOT AT ALL

## 2025-02-07 NOTE — PROGRESS NOTES
"Subjective   Patient ID: Bekah Nicholson is a 55 y.o. female who presents for Well Woman Exam (EP. Here for [employer] and PAP.).  HPI  Feels well, no specific complaints or concerns today except for \"menopause\"- particularly irritability/mood swings    Mammogram 9/2024  Colonoscopy 2021  Review of Systems  General: no fever or night sweats, no change in weight  Eyes: no vision disturbance  ENT: no mouth lesions, no sore throat, and no dysphagia  CV: no chest pain, no palpitations, no lower extremity edema  Resp: no shortness of breath, no cough  GI: no abdominal pain, no change in bowel habits  : no urinary problems  MSK: no arthralgias, myalgias, or back pain  Skin; no rashes or new/changed skin lesions  Neuro: no headaches     Objective   /82 (BP Location: Left arm)   Pulse 88   Ht 1.727 m (5' 8\")   Wt 79.8 kg (176 lb)   SpO2 96%   BMI 26.76 kg/m²    Physical Exam  Appears well.     HEENT: OP clear. Sclera white. PERRL. EACs and TMs clear.     Neck: supple, no masses, or lymphadenopathy. No thyromegaly.     CVS: RRR. No murmurs. No peripheral edema.    Lungs: clear with normal inspirations and expirations.    Breasts: Symmetrical, no masses, no skin retraction or dimpling. No nipple discharge. No axillary nodes.    Abd: soft, NT, no masses or HSM.    Pelvic: No vulvar lesions. No vaginal lesions or discharge. Normal appearing cervix with no lesions. No adnexal masses. Normal uterus.    Rectal: No masses. Guaiac negative stool.    Skin: No suspicious lesions.      Reviewed recent labs:  8/2023  (3/2024 199  Assessment/Plan   Diagnoses and all orders for this visit:  Health maintenance examination  Mixed hyperlipidemia  -     CT cardiac scoring wo IV contrast; Future  Menopause  -     progesterone (Prometrium) 100 mg capsule; Take 1 capsule (100 mg) by mouth once daily.  Breast cancer screening by mammogram  -     BI mammo bilateral screening tomosynthesis; Future  Encounter for screening " mammogram for malignant neoplasm of breast  -     BI mammo bilateral screening tomosynthesis; Future  Encounter for Papanicolaou smear for cervical cancer screening  -     THINPREP PAP TEST  Cervical cancer screening  -     THINPREP PAP TEST    Discussed CT cardiac score- pt agrees    Follow up 3 mos to see how she is doing on the progesterone     Yvette Maria MD  Family Medicine   [facility]

## 2025-02-19 LAB
CYTOLOGY CMNT CVX/VAG CYTO-IMP: NORMAL
LAB AP HPV GENOTYPE QUESTION: YES
LAB AP HPV HR: NORMAL
LABORATORY COMMENT REPORT: NORMAL
PATH REPORT.TOTAL CANCER: NORMAL

## 2025-06-19 ENCOUNTER — TELEPHONE (OUTPATIENT)
Dept: PRIMARY CARE | Facility: CLINIC | Age: 55
End: 2025-06-19
Payer: COMMERCIAL

## 2025-06-19 DIAGNOSIS — Z78.0 MENOPAUSE: ICD-10-CM

## 2025-06-19 NOTE — TELEPHONE ENCOUNTER
Spoke w/patient, needs refill on  Progesterone 100 mg - 8 pills left   Send to [facility], [place]  Last appt. 2/7/25  Upcoming appt. 2/9/26  Last BW 2/5/25   Graft Donor Site Bandage (Optional-Leave Blank If You Don't Want In Note): Steri-strips and a pressure bandage were applied to the donor site.

## 2025-06-19 NOTE — TELEPHONE ENCOUNTER
LAST APPT 02/07/25  NEXT APPT 02/09/26  LAST BW 02/05/25    Patient told Joana only seen once a year.  It looks like you wanted a 3 mth follow on this medication you started in Feb.  Do you want to see her? I only pulled for 30 day fill and is she normally a once a year appt?

## 2025-06-23 RX ORDER — PROGESTERONE 100 MG/1
100 CAPSULE ORAL DAILY
Qty: 90 CAPSULE | Refills: 1 | Status: SHIPPED | OUTPATIENT
Start: 2025-06-23 | End: 2026-06-23

## 2025-06-23 NOTE — TELEPHONE ENCOUNTER
I can send in refills but patient was to follow up with me in May to assess how the progesterone was working for her- I'm assuming it is working for her?

## 2025-06-30 ENCOUNTER — APPOINTMENT (OUTPATIENT)
Dept: RADIOLOGY | Facility: CLINIC | Age: 55
End: 2025-06-30
Payer: COMMERCIAL

## 2025-07-02 ENCOUNTER — APPOINTMENT (OUTPATIENT)
Dept: OBSTETRICS AND GYNECOLOGY | Facility: CLINIC | Age: 55
End: 2025-07-02
Payer: COMMERCIAL

## 2025-07-09 ENCOUNTER — TELEPHONE (OUTPATIENT)
Dept: OBSTETRICS AND GYNECOLOGY | Facility: CLINIC | Age: 55
End: 2025-07-09
Payer: COMMERCIAL

## 2025-07-09 NOTE — TELEPHONE ENCOUNTER
----- Message from Nurse Ashlee GOMEZ sent at 7/9/2025  9:38 AM EDT -----  Regarding: bloodwork?  Contact: [phone]  Bekah Nicholson 1970-- initial appt. With Dr. Man on 7/16; inquiring if any bloodwork will be ordered prior to appt. Please call [phone]

## 2025-07-09 NOTE — TELEPHONE ENCOUNTER
Rn called pt and reviewed since not an established pt yet with Dr Man that labs to be ordered when seen at that visit.

## 2025-07-16 ENCOUNTER — OFFICE VISIT (OUTPATIENT)
Dept: OBSTETRICS AND GYNECOLOGY | Facility: CLINIC | Age: 55
End: 2025-07-16
Payer: COMMERCIAL

## 2025-07-16 VITALS
HEART RATE: 80 BPM | DIASTOLIC BLOOD PRESSURE: 80 MMHG | SYSTOLIC BLOOD PRESSURE: 134 MMHG | WEIGHT: 178.8 LBS | BODY MASS INDEX: 27.19 KG/M2

## 2025-07-16 DIAGNOSIS — N95.1 MENOPAUSAL STATE: Primary | ICD-10-CM

## 2025-07-16 PROCEDURE — 99203 OFFICE O/P NEW LOW 30 MIN: CPT | Performed by: STUDENT IN AN ORGANIZED HEALTH CARE EDUCATION/TRAINING PROGRAM

## 2025-07-16 PROCEDURE — 99213 OFFICE O/P EST LOW 20 MIN: CPT | Performed by: STUDENT IN AN ORGANIZED HEALTH CARE EDUCATION/TRAINING PROGRAM

## 2025-07-16 PROCEDURE — 1036F TOBACCO NON-USER: CPT | Performed by: STUDENT IN AN ORGANIZED HEALTH CARE EDUCATION/TRAINING PROGRAM

## 2025-07-16 RX ORDER — ESTRADIOL 0.04 MG/D
1 FILM, EXTENDED RELEASE TRANSDERMAL 2 TIMES WEEKLY
Qty: 24 PATCH | Refills: 3 | Status: SHIPPED | OUTPATIENT
Start: 2025-07-17 | End: 2026-07-17

## 2025-07-16 NOTE — PROGRESS NOTES
Subjective   Bekah Nicholson is a 55 y.o.  who presents today to discuss menopause.    Menopausal symptoms:  - Last period 1 year 3 months ago  - Had severe mood swings, leading to passive suicidal ideation, not currently - new  - Significant fatigue  - Night sweats occurring, improved since started on progesterone, last year prior to progesterone was horrible  - Occasional hot flashes during day, not too bothersome   - Brain fog    ObHx: P1  GynHx:   Sexually active occasionally  No dryness currently  PMH: Raynaud's, h/o panic attacks  PSH: ex laparoscopy, hemorrhoid surgery  FamHx:  Father- testicular cancer  No fam hx CV disease or VTE  Shx:   3x/ week, 1-2 glasses wine, no tobacco use, no drug use  Lives  and mikalther  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  who presents today for menopause consult.    - MHT risks/benefits discussed. WHI discussed and discussed that findings are not necessarily applicable to women starting HT within the first 10 years of menopause.  In women who are perimenopausal or within 10 years of menopause; the benefits of HT are likely to outweigh the risks.  - Reviewed options for administration of HRT and importance of endometrial protection with progesterone. She is already on oral progesterone, will start with estrogen patch  - Discussed slight increase in risk of VTE, stroke, breast cancer with HRT    Follow up 3months    Chen Man MD

## 2025-09-03 ENCOUNTER — OFFICE VISIT (OUTPATIENT)
Dept: OBSTETRICS AND GYNECOLOGY | Facility: CLINIC | Age: 55
End: 2025-09-03
Payer: COMMERCIAL

## 2025-09-03 VITALS
SYSTOLIC BLOOD PRESSURE: 127 MMHG | HEART RATE: 83 BPM | DIASTOLIC BLOOD PRESSURE: 86 MMHG | WEIGHT: 179 LBS | BODY MASS INDEX: 27.22 KG/M2

## 2025-09-03 DIAGNOSIS — Z92.29 HISTORY OF POSTMENOPAUSAL HRT: Primary | ICD-10-CM

## 2025-09-03 PROCEDURE — 99213 OFFICE O/P EST LOW 20 MIN: CPT | Performed by: STUDENT IN AN ORGANIZED HEALTH CARE EDUCATION/TRAINING PROGRAM

## 2025-09-03 PROCEDURE — 99212 OFFICE O/P EST SF 10 MIN: CPT | Performed by: STUDENT IN AN ORGANIZED HEALTH CARE EDUCATION/TRAINING PROGRAM

## 2025-09-03 PROCEDURE — 1036F TOBACCO NON-USER: CPT | Performed by: STUDENT IN AN ORGANIZED HEALTH CARE EDUCATION/TRAINING PROGRAM

## 2025-10-17 ENCOUNTER — APPOINTMENT (OUTPATIENT)
Facility: CLINIC | Age: 55
End: 2025-10-17
Payer: COMMERCIAL

## 2026-02-09 ENCOUNTER — APPOINTMENT (OUTPATIENT)
Dept: PRIMARY CARE | Facility: CLINIC | Age: 56
End: 2026-02-09
Payer: COMMERCIAL